# Patient Record
Sex: FEMALE | URBAN - METROPOLITAN AREA
[De-identification: names, ages, dates, MRNs, and addresses within clinical notes are randomized per-mention and may not be internally consistent; named-entity substitution may affect disease eponyms.]

---

## 2019-01-14 ENCOUNTER — OFFICE VISIT - HEALTHEAST (OUTPATIENT)
Dept: GERIATRICS | Facility: CLINIC | Age: 84
End: 2019-01-14

## 2019-01-14 DIAGNOSIS — S82.245S: ICD-10-CM

## 2019-01-14 DIAGNOSIS — F32.A DEPRESSION, UNSPECIFIED DEPRESSION TYPE: ICD-10-CM

## 2019-01-14 DIAGNOSIS — E66.01 MORBID OBESITY WITH BODY MASS INDEX (BMI) OF 40.0 TO 44.9 IN ADULT (H): ICD-10-CM

## 2019-01-14 DIAGNOSIS — M47.816 OSTEOARTHRITIS OF LUMBAR SPINE, UNSPECIFIED SPINAL OSTEOARTHRITIS COMPLICATION STATUS: ICD-10-CM

## 2019-01-14 DIAGNOSIS — Z79.4 INSULIN-REQUIRING OR DEPENDENT TYPE II DIABETES MELLITUS (H): ICD-10-CM

## 2019-01-14 DIAGNOSIS — E11.9 INSULIN-REQUIRING OR DEPENDENT TYPE II DIABETES MELLITUS (H): ICD-10-CM

## 2019-01-14 DIAGNOSIS — G89.29 OTHER CHRONIC PAIN: ICD-10-CM

## 2019-01-15 ENCOUNTER — OFFICE VISIT - HEALTHEAST (OUTPATIENT)
Dept: GERIATRICS | Facility: CLINIC | Age: 84
End: 2019-01-15

## 2019-01-15 ENCOUNTER — AMBULATORY - HEALTHEAST (OUTPATIENT)
Dept: ADMINISTRATIVE | Facility: CLINIC | Age: 84
End: 2019-01-15

## 2019-01-15 DIAGNOSIS — I10 ESSENTIAL HYPERTENSION: ICD-10-CM

## 2019-01-15 DIAGNOSIS — S82.832D CLOSED FRACTURE OF PROXIMAL END OF LEFT FIBULA WITH ROUTINE HEALING, UNSPECIFIED FRACTURE MORPHOLOGY, SUBSEQUENT ENCOUNTER: ICD-10-CM

## 2019-01-15 DIAGNOSIS — E10.9 TYPE 1 DIABETES MELLITUS WITHOUT COMPLICATION (H): ICD-10-CM

## 2019-01-15 DIAGNOSIS — M51.369 DDD (DEGENERATIVE DISC DISEASE), LUMBAR: ICD-10-CM

## 2019-01-15 RX ORDER — QUINAPRIL 20 MG/1
40 TABLET ORAL DAILY
Status: SHIPPED | COMMUNITY
Start: 2019-01-15

## 2019-01-15 RX ORDER — GABAPENTIN 300 MG/1
300 CAPSULE ORAL DAILY
Status: SHIPPED | COMMUNITY
Start: 2019-01-15

## 2019-01-15 RX ORDER — ASPIRIN 81 MG/1
81 TABLET, CHEWABLE ORAL DAILY
Status: SHIPPED | COMMUNITY
Start: 2019-01-15

## 2019-01-15 RX ORDER — SIMVASTATIN 20 MG
20 TABLET ORAL AT BEDTIME
Status: SHIPPED | COMMUNITY
Start: 2019-01-15

## 2019-01-15 RX ORDER — LANOLIN ALCOHOL/MO/W.PET/CERES
3 CREAM (GRAM) TOPICAL AT BEDTIME
Status: SHIPPED | COMMUNITY
Start: 2019-01-15

## 2019-01-15 RX ORDER — METFORMIN HCL 500 MG
1000 TABLET, EXTENDED RELEASE 24 HR ORAL
Status: SHIPPED | COMMUNITY
Start: 2019-01-15

## 2019-01-16 ENCOUNTER — RECORDS - HEALTHEAST (OUTPATIENT)
Dept: LAB | Facility: CLINIC | Age: 84
End: 2019-01-16

## 2019-01-18 ENCOUNTER — OFFICE VISIT - HEALTHEAST (OUTPATIENT)
Dept: GERIATRICS | Facility: CLINIC | Age: 84
End: 2019-01-18

## 2019-01-18 DIAGNOSIS — I10 ESSENTIAL HYPERTENSION: ICD-10-CM

## 2019-01-18 DIAGNOSIS — M51.369 DDD (DEGENERATIVE DISC DISEASE), LUMBAR: ICD-10-CM

## 2019-01-18 DIAGNOSIS — S82.832D CLOSED FRACTURE OF PROXIMAL END OF LEFT FIBULA WITH ROUTINE HEALING, UNSPECIFIED FRACTURE MORPHOLOGY, SUBSEQUENT ENCOUNTER: ICD-10-CM

## 2019-01-18 DIAGNOSIS — E10.9 TYPE 1 DIABETES MELLITUS WITHOUT COMPLICATION (H): ICD-10-CM

## 2019-01-18 LAB — 25(OH)D3 SERPL-MCNC: 23.1 NG/ML (ref 30–80)

## 2019-01-22 ENCOUNTER — OFFICE VISIT - HEALTHEAST (OUTPATIENT)
Dept: GERIATRICS | Facility: CLINIC | Age: 84
End: 2019-01-22

## 2019-01-22 DIAGNOSIS — S82.832D CLOSED FRACTURE OF PROXIMAL END OF LEFT FIBULA WITH ROUTINE HEALING, UNSPECIFIED FRACTURE MORPHOLOGY, SUBSEQUENT ENCOUNTER: ICD-10-CM

## 2019-01-22 DIAGNOSIS — I10 ESSENTIAL HYPERTENSION: ICD-10-CM

## 2019-01-22 DIAGNOSIS — M51.369 DDD (DEGENERATIVE DISC DISEASE), LUMBAR: ICD-10-CM

## 2019-01-22 DIAGNOSIS — E10.9 TYPE 1 DIABETES MELLITUS WITHOUT COMPLICATION (H): ICD-10-CM

## 2019-01-25 ENCOUNTER — OFFICE VISIT - HEALTHEAST (OUTPATIENT)
Dept: GERIATRICS | Facility: CLINIC | Age: 84
End: 2019-01-25

## 2019-01-25 DIAGNOSIS — S82.832D CLOSED FRACTURE OF PROXIMAL END OF LEFT FIBULA WITH ROUTINE HEALING, UNSPECIFIED FRACTURE MORPHOLOGY, SUBSEQUENT ENCOUNTER: ICD-10-CM

## 2019-01-25 DIAGNOSIS — I10 ESSENTIAL HYPERTENSION: ICD-10-CM

## 2019-01-25 DIAGNOSIS — E10.9 TYPE 1 DIABETES MELLITUS WITHOUT COMPLICATION (H): ICD-10-CM

## 2019-01-25 DIAGNOSIS — R53.1 GENERAL WEAKNESS: ICD-10-CM

## 2019-01-29 ENCOUNTER — OFFICE VISIT - HEALTHEAST (OUTPATIENT)
Dept: GERIATRICS | Facility: CLINIC | Age: 84
End: 2019-01-29

## 2019-01-29 DIAGNOSIS — R53.81 PHYSICAL DEBILITY: ICD-10-CM

## 2019-01-29 DIAGNOSIS — S82.832D CLOSED FRACTURE OF PROXIMAL END OF LEFT FIBULA WITH ROUTINE HEALING, UNSPECIFIED FRACTURE MORPHOLOGY, SUBSEQUENT ENCOUNTER: ICD-10-CM

## 2019-01-29 DIAGNOSIS — E10.9 TYPE 1 DIABETES MELLITUS WITHOUT COMPLICATION (H): ICD-10-CM

## 2019-01-29 DIAGNOSIS — I10 ESSENTIAL HYPERTENSION: ICD-10-CM

## 2019-02-01 ENCOUNTER — OFFICE VISIT - HEALTHEAST (OUTPATIENT)
Dept: GERIATRICS | Facility: CLINIC | Age: 84
End: 2019-02-01

## 2019-02-01 DIAGNOSIS — S82.832D CLOSED FRACTURE OF PROXIMAL END OF LEFT FIBULA WITH ROUTINE HEALING, UNSPECIFIED FRACTURE MORPHOLOGY, SUBSEQUENT ENCOUNTER: ICD-10-CM

## 2019-02-01 DIAGNOSIS — E10.9 TYPE 1 DIABETES MELLITUS WITHOUT COMPLICATION (H): ICD-10-CM

## 2019-02-01 DIAGNOSIS — R52 PAIN MANAGEMENT: ICD-10-CM

## 2019-02-01 DIAGNOSIS — R29.898 LEG WEAKNESS, BILATERAL: ICD-10-CM

## 2019-02-05 ENCOUNTER — OFFICE VISIT - HEALTHEAST (OUTPATIENT)
Dept: GERIATRICS | Facility: CLINIC | Age: 84
End: 2019-02-05

## 2019-02-05 DIAGNOSIS — E10.9 TYPE 1 DIABETES MELLITUS WITHOUT COMPLICATION (H): ICD-10-CM

## 2019-02-05 DIAGNOSIS — S82.832D CLOSED FRACTURE OF PROXIMAL END OF LEFT FIBULA WITH ROUTINE HEALING, UNSPECIFIED FRACTURE MORPHOLOGY, SUBSEQUENT ENCOUNTER: ICD-10-CM

## 2019-02-05 DIAGNOSIS — R53.81 PHYSICAL DEBILITY: ICD-10-CM

## 2019-02-05 DIAGNOSIS — I10 ESSENTIAL HYPERTENSION: ICD-10-CM

## 2019-02-05 RX ORDER — CLONIDINE HYDROCHLORIDE 0.1 MG/1
0.1 TABLET ORAL DAILY
Status: SHIPPED | COMMUNITY
Start: 2019-02-05

## 2019-02-08 ENCOUNTER — OFFICE VISIT - HEALTHEAST (OUTPATIENT)
Dept: GERIATRICS | Facility: CLINIC | Age: 84
End: 2019-02-08

## 2019-02-08 DIAGNOSIS — I10 ESSENTIAL HYPERTENSION: ICD-10-CM

## 2019-02-08 DIAGNOSIS — S82.832D CLOSED FRACTURE OF PROXIMAL END OF LEFT FIBULA WITH ROUTINE HEALING, UNSPECIFIED FRACTURE MORPHOLOGY, SUBSEQUENT ENCOUNTER: ICD-10-CM

## 2019-02-08 DIAGNOSIS — E10.9 TYPE 1 DIABETES MELLITUS WITHOUT COMPLICATION (H): ICD-10-CM

## 2019-02-08 DIAGNOSIS — M51.369 DDD (DEGENERATIVE DISC DISEASE), LUMBAR: ICD-10-CM

## 2019-02-11 ENCOUNTER — OFFICE VISIT - HEALTHEAST (OUTPATIENT)
Dept: GERIATRICS | Facility: CLINIC | Age: 84
End: 2019-02-11

## 2019-02-11 DIAGNOSIS — S82.832D CLOSED FRACTURE OF PROXIMAL END OF LEFT FIBULA WITH ROUTINE HEALING, UNSPECIFIED FRACTURE MORPHOLOGY, SUBSEQUENT ENCOUNTER: ICD-10-CM

## 2019-02-11 DIAGNOSIS — G47.00 INSOMNIA, UNSPECIFIED TYPE: ICD-10-CM

## 2019-02-11 DIAGNOSIS — R53.81 PHYSICAL DEBILITY: ICD-10-CM

## 2019-02-11 DIAGNOSIS — I10 HYPERTENSION, UNSPECIFIED TYPE: ICD-10-CM

## 2019-02-11 DIAGNOSIS — R29.898 LEG WEAKNESS, BILATERAL: ICD-10-CM

## 2019-02-15 ENCOUNTER — OFFICE VISIT - HEALTHEAST (OUTPATIENT)
Dept: GERIATRICS | Facility: CLINIC | Age: 84
End: 2019-02-15

## 2019-02-15 DIAGNOSIS — I10 ESSENTIAL HYPERTENSION: ICD-10-CM

## 2019-02-15 DIAGNOSIS — S82.832D CLOSED FRACTURE OF PROXIMAL END OF LEFT FIBULA WITH ROUTINE HEALING, UNSPECIFIED FRACTURE MORPHOLOGY, SUBSEQUENT ENCOUNTER: ICD-10-CM

## 2019-02-15 DIAGNOSIS — E10.9 TYPE 1 DIABETES MELLITUS WITHOUT COMPLICATION (H): ICD-10-CM

## 2019-02-15 DIAGNOSIS — G47.9 SLEEP DISORDER: ICD-10-CM

## 2019-02-15 RX ORDER — HYDROCHLOROTHIAZIDE 25 MG/1
25 TABLET ORAL DAILY
Status: SHIPPED | COMMUNITY
Start: 2019-02-15

## 2019-02-18 ENCOUNTER — AMBULATORY - HEALTHEAST (OUTPATIENT)
Dept: GERIATRICS | Facility: CLINIC | Age: 84
End: 2019-02-18

## 2019-03-01 ENCOUNTER — RECORDS - HEALTHEAST (OUTPATIENT)
Dept: LAB | Facility: CLINIC | Age: 84
End: 2019-03-01

## 2019-03-05 LAB — 25(OH)D3 SERPL-MCNC: 26.2 NG/ML (ref 30–80)

## 2019-03-14 ENCOUNTER — RECORDS - HEALTHEAST (OUTPATIENT)
Dept: LAB | Facility: CLINIC | Age: 84
End: 2019-03-14

## 2019-03-14 LAB
ANION GAP SERPL CALCULATED.3IONS-SCNC: 7 MMOL/L (ref 5–18)
BUN SERPL-MCNC: 19 MG/DL (ref 8–28)
CALCIUM SERPL-MCNC: 9.1 MG/DL (ref 8.5–10.5)
CHLORIDE BLD-SCNC: 107 MMOL/L (ref 98–107)
CO2 SERPL-SCNC: 25 MMOL/L (ref 22–31)
CREAT SERPL-MCNC: 1.06 MG/DL (ref 0.6–1.1)
ERYTHROCYTE [DISTWIDTH] IN BLOOD BY AUTOMATED COUNT: 15.2 % (ref 11–14.5)
GFR SERPL CREATININE-BSD FRML MDRD: 49 ML/MIN/1.73M2
GLUCOSE BLD-MCNC: 137 MG/DL (ref 70–125)
HBA1C MFR BLD: 7.5 % (ref 4.2–6.1)
HCT VFR BLD AUTO: 37.9 % (ref 35–47)
HGB BLD-MCNC: 11.7 G/DL (ref 12–16)
MCH RBC QN AUTO: 29 PG (ref 27–34)
MCHC RBC AUTO-ENTMCNC: 30.9 G/DL (ref 32–36)
MCV RBC AUTO: 94 FL (ref 80–100)
PLATELET # BLD AUTO: 297 THOU/UL (ref 140–440)
PMV BLD AUTO: 8.9 FL (ref 8.5–12.5)
POTASSIUM BLD-SCNC: 4.6 MMOL/L (ref 3.5–5)
RBC # BLD AUTO: 4.04 MILL/UL (ref 3.8–5.4)
SODIUM SERPL-SCNC: 139 MMOL/L (ref 136–145)
WBC: 6.5 THOU/UL (ref 4–11)

## 2019-04-03 ENCOUNTER — RECORDS - HEALTHEAST (OUTPATIENT)
Dept: LAB | Facility: CLINIC | Age: 84
End: 2019-04-03

## 2019-04-03 LAB
ALBUMIN UR-MCNC: NEGATIVE MG/DL
APPEARANCE UR: ABNORMAL
BACTERIA #/AREA URNS HPF: ABNORMAL HPF
BILIRUB UR QL STRIP: NEGATIVE
COLOR UR AUTO: ABNORMAL
ERYTHROCYTE [DISTWIDTH] IN BLOOD BY AUTOMATED COUNT: 15.9 % (ref 11–14.5)
GLUCOSE UR STRIP-MCNC: ABNORMAL MG/DL
HCT VFR BLD AUTO: 37.4 % (ref 35–47)
HGB BLD-MCNC: 11.6 G/DL (ref 12–16)
HGB UR QL STRIP: ABNORMAL
KETONES UR STRIP-MCNC: NEGATIVE MG/DL
LEUKOCYTE ESTERASE UR QL STRIP: ABNORMAL
MCH RBC QN AUTO: 29.4 PG (ref 27–34)
MCHC RBC AUTO-ENTMCNC: 31 G/DL (ref 32–36)
MCV RBC AUTO: 95 FL (ref 80–100)
NITRATE UR QL: POSITIVE
PH UR STRIP: 5 [PH] (ref 4.5–8)
PLATELET # BLD AUTO: 316 THOU/UL (ref 140–440)
PMV BLD AUTO: 8.7 FL (ref 8.5–12.5)
RBC # BLD AUTO: 3.94 MILL/UL (ref 3.8–5.4)
RBC #/AREA URNS AUTO: ABNORMAL HPF
SP GR UR STRIP: 1.01 (ref 1–1.03)
SQUAMOUS #/AREA URNS AUTO: ABNORMAL LPF
UROBILINOGEN UR STRIP-ACNC: ABNORMAL
WBC #/AREA URNS AUTO: >100 HPF
WBC CLUMPS #/AREA URNS HPF: PRESENT /[HPF]
WBC: 12.8 THOU/UL (ref 4–11)

## 2019-04-05 LAB — BACTERIA SPEC CULT: ABNORMAL

## 2019-05-10 ENCOUNTER — RECORDS - HEALTHEAST (OUTPATIENT)
Dept: LAB | Facility: CLINIC | Age: 84
End: 2019-05-10

## 2019-05-10 LAB — 25(OH)D3 SERPL-MCNC: 20.8 NG/ML (ref 30–80)

## 2021-06-02 ENCOUNTER — RECORDS - HEALTHEAST (OUTPATIENT)
Dept: ADMINISTRATIVE | Facility: CLINIC | Age: 86
End: 2021-06-02

## 2021-06-02 VITALS — WEIGHT: 229.2 LBS

## 2021-06-16 PROBLEM — M51.369 DDD (DEGENERATIVE DISC DISEASE), LUMBAR: Status: ACTIVE | Noted: 2019-01-15

## 2021-06-16 PROBLEM — I10 ESSENTIAL HYPERTENSION: Status: ACTIVE | Noted: 2019-01-15

## 2021-06-16 PROBLEM — E10.9 TYPE 1 DIABETES MELLITUS WITHOUT COMPLICATION (H): Status: ACTIVE | Noted: 2019-01-15

## 2021-06-16 PROBLEM — S82.832D CLOSED FRACTURE OF PROXIMAL END OF LEFT FIBULA WITH ROUTINE HEALING, UNSPECIFIED FRACTURE MORPHOLOGY, SUBSEQUENT ENCOUNTER: Status: ACTIVE | Noted: 2019-01-15

## 2021-06-18 NOTE — LETTER
Letter by Johnson, Michael Duane, CNP at      Author: Johnson, Michael Duane, CNP Service: -- Author Type: --    Filed:  Encounter Date: 2/15/2019 Status: (Other)         Patient: Wendie Castellanos   MR Number: [id number]   YOB: 1929   Date of Visit: 2/15/2019     [facility]    Facility:   [facility] [[id number]]   Code Status: FULL CODE  PCP: Nichole Kahn PA-C   Phone: [phone]   Fax: [phone]      CHIEF COMPLAINT/REASON FOR VISIT:  Chief Complaint   Patient presents with   ? Discharge Summary       HISTORY COURSE:  Wendie is a 89 y.o. female who I was asked to see secondary to most recent admission to the transitional care unit but also most recent hospitalization January 7 - January 11, 2019 secondary to a fall while trying to get off the toilet earlier in the day.  She has a history of right leg pain and weakness chronically due to lumbar degenerative disc disease.  She hit her left leg on the toilet.  The x-ray did show a spiral fracture of the tibia and she went for surgery on January 8, 2019.  She also does have a history of diabetes we did a chance to address that.  She is currently on insulin 15 units twice daily 7030 mix along with metformin thousand milligrams daily and blood sugars have been pretty much less than 170 there was one at 250 4P most recently was having issues with sleep melatonin was initially instituted she did sleep pretty good last night.  Regarding her osteoporosis she does have a vitamin D level ordered we will wait for those results.   She has been on the transitional care unit and at this point fairly limited rehabilitation has been able to do slide board transfers but the progress has stalled.  They did recommend long-term care for her.  She will be discharging to a long-term care facility.  Her blood pressures have been occasionally labile but more so running in the 140-160 systolic range of been a number of  adjustments to her medication including continuing with the lisinopril but also adding clonidine 0.1 mg daily and then recently added HCTZ 25 mg.  That did seem to normalize her blood pressures.  For sleep was ordered melatonin and then eventually added was trazodone 50 mg and her sleep also did improve.  Not having any pain.  Her blood sugars overall have been doing fairly well using her 7030 mix 15 units twice daily.  The blood sugars in the morning range 121-170 in the p.m. ranging 140-207.  Her appetite is good.  Her moods have been stable she does have a history of depression currently on fluoxetine 40 mg.  Also added was vitamin D2 50,000 units weekly secondary to a vitamin D level of 23 on January 17 and in about 12 weeks from that date she should have another vitamin D checked.  Review of Systems  Currently she denies any chills or fever coughing wheezing chest pain dizziness or vertigo nausea vomiting diarrhea dysuria flulike symptoms headache stiff neck or swollen glands.  History of lumbar degenerative disc disease history of falls chronic pain depression diabetes and recent left tibial fracture     Vitals:    02/15/19 1016   BP: 139/65   Pulse: 69   Resp: 18   Temp: 97.1  F (36.2  C)   SpO2: 96%       Physical Exam  Constitutional: No distress.   Cardiovascular: Normal rate, regular rhythm and normal heart sounds.   Pulmonary/Chest: Breath sounds normal.   Abdominal: Bowel sounds are normal.   Musculoskeletal:   Recent left tibial fracture secondary to fall status post IM nail.  History of lumbar degenerative disc disease.  History of falls .  Cam boot.   Neurological: She is alert.   Psychiatric: Her behavior is normal.       Lab Results   Component Value Date    WBC 6.9 08/16/2010    HGB 13.9 08/16/2010    HCT 40.7 08/16/2010    MCV 90 08/16/2010     08/16/2010     Vitamin D, Total (25-Hydroxy)   Date Value Ref Range Status   01/17/2019 23.1 (L) 30.0 - 80.0 ng/mL Final     Results for orders  placed or performed during the hospital encounter of 08/16/10   Basic Metabolic Panel   Result Value Ref Range    Sodium 138 136 - 145 mmol/L    Potassium 3.6 3.5 - 5.0 mmol/L    CO2 29 25 - 31 mmol/L    Chloride 101 98 - 107 mmol/L    Anion Gap, Calculation 8 5 - 18 mmol/L    BUN 11 8 - 28 mg/dL    Creatinine 0.90 0.60 - 1.10 mg/dL    GFR MDRD Non Af Amer >60 >60 ml/min/1.73m2    GFR MDRD Af Amer >60 >60 ml/min/1.73m2    Glucose 183 (H) 70 - 125 mg/dL    Calcium 9.5 8.5 - 10.5 mg/dL         MEDICATION LIST:  Current Outpatient Medications   Medication Sig   ? hydroCHLOROthiazide (HYDRODIURIL) 25 MG tablet Take 25 mg by mouth daily.   ? aspirin 81 mg chewable tablet Chew 81 mg daily.   ? cloNIDine HCl (CATAPRES) 0.1 MG tablet Take 0.1 mg by mouth daily.   ? FLUoxetine (PROZAC) 20 MG capsule Take 40 mg by mouth daily.   ? gabapentin (NEURONTIN) 300 MG capsule Take 300 mg by mouth daily.   ? insulin aspart protamine-insulin aspart (NOVOLOG/HUMALOG 70-30 FLEXPEN) 100 unit/mL (70-30) pen Inject 15 Units under the skin 2 (two) times a day before meals.   ? melatonin 3 mg Tab tablet Take 3 mg by mouth at bedtime.   ? metFORMIN (GLUCOPHAGE-XR) 500 MG 24 hr tablet Take 1,000 mg by mouth daily with supper.   ? quinapril (ACCUPRIL) 20 MG tablet Take 40 mg by mouth daily.   ? simvastatin (ZOCOR) 20 MG tablet Take 20 mg by mouth at bedtime.       DISCHARGE DIAGNOSIS:    ICD-10-CM    1. Essential hypertension I10    2. Type 1 diabetes mellitus without complication (H) E10.9    3. Closed fracture of proximal end of left fibula with routine healing, unspecified fracture morphology, subsequent encounter S82.832D    4. Sleep disorder G47.9        MEDICAL EQUIPMENT NEEDS:  None    DISCHARGE PLAN/FACE TO FACE:  I certify that services are/were furnished while this patient was under the care of a physician and that a physician or an allowed non-physician practitioner (NPP), had a face-to-face encounter that meets the physician  face-to-face encounter requirements. The encounter was in whole, or in part, related to the primary reason for home health. The patient is confined to his/her home and needs intermittent skilled nursing, physical therapy, speech-language pathology, or the continued need for occupational therapy. A plan of care has been established by a physician and is periodically reviewed by a physician.  Date of Face-to-Face Encounter: February 15, 2019    I certify that, based on my findings, the following services are medically necessary home health services: She will be discharging to [facility] on February 15, 2019 with current medications and also will receive physical and occupational therapy    My clinical findings support the need for the above skilled services because: (Please write a brief narrative summary that describes what the RN, PT, SLP, or other services will be doing in the home. A list of diagnoses in this section does not meet the CMS requirements.)  She will require the skilled services secondary to her tibial fracture along with generalized weakness self-care deficits plateauing therapy.    This patient is homebound because: (Please write a brief narrative summary describing the functional limitations as to why this patient is homebound and specifically what makes this patient homebound.)  Secondary to multiple chronic medical conditions including her tibial fracture self-care deficits safety medication management and she will be going to long-term care.    The patient is, or has been, under my care and I have initiated the establishment of the plan of care. This patient will be followed by a physician who will periodically review the plan of care.    Schedule follow up visit with primary care provider within 7 days to reestablish care.  She will follow-up with the primary care provider at the facility to go over her medications and certainly any future laboratory studies including the next vitamin D  level 12 weeks after January 17 secondary to a level of 23 and currently on vitamin D2 50,000 units weekly.  Also manage and monitor and follow the blood pressures closely and make adjustments as needed.  She did not have any other further questions or concerns regarding her stay as well as interventions while on the transitional care unit.    Discharge coordination of care greater than 30 minutes.  Electronically signed by: Michael Duane Johnson, CNP

## 2021-06-18 NOTE — LETTER
Letter by Johnson, Michael Duane, CNP at      Author: Johnson, Michael Duane, CNP Service: -- Author Type: --    Filed:  Encounter Date: 1/18/2019 Status: (Other)         Patient: Wendie Castellanos   MR Number: [id number]   YOB: 1929   Date of Visit: 1/18/2019     [facility]    Facility:   [facility] [[id number]]   Code Status: FULL CODE      CHIEF COMPLAINT/REASON FOR VISIT:  Chief Complaint   Patient presents with   ? Follow Up     rehab, foot       HISTORY:      HPI: Wendie is a 89 y.o. female who I had the pleasure to revisit with secondary to her hospital admission January 7 - January 11, 2019 secondary to a fall sustaining a spiral fracture of the tibia status post IM nail January 2019.  She currently does have a cam boot good CMS to her left foot.  Blood sugars ranging pretty much all less than 180 she has been normotensive and afebrile.  Taking melatonin for sleep which she states her sleep are working well.  Regular diet.  And also the pain is managed she does take gabapentin 300 mg during the day otherwise no other Tylenol or other analgesic.    Past Medical History:   Diagnosis Date   ? Branch retinal vein occlusion with macular edema of left eye    ? Closed fracture of shaft of left tibia    ? DDD (degenerative disc disease), lumbar    ? Depression    ? DM2 (diabetes mellitus, type 2) (H)    ? LTBI (latent tuberculosis infection)    ? Morbid obesity (H)    ? Nonproliferative diabetic retinopathy of both eyes (H)    ? Ocular hypertension    ? Pseudophakia of both eyes    ? Urinary incontinence              Family History   Problem Relation Age of Onset   ? Heart disease Mother         MI   ? Heart disease Father         MI   ? Glaucoma Brother         2 brothers    ? Diabetes Brother    ? Diabetes Brother    ? Diabetes Brother    ? Heart disease Brother    ? Heart disease Brother    ? Heart disease Brother      Social History     Socioeconomic  History   ? Marital status:      Spouse name: Not on file   ? Number of children: Not on file   ? Years of education: Not on file   ? Highest education level: Not on file   Social Needs   ? Financial resource strain: Not on file   ? Food insecurity - worry: Not on file   ? Food insecurity - inability: Not on file   ? Transportation needs - medical: Not on file   ? Transportation needs - non-medical: Not on file   Occupational History   ? Not on file   Tobacco Use   ? Smoking status: Never Smoker   ? Smokeless tobacco: Never Used   Substance and Sexual Activity   ? Alcohol use: No   ? Drug use: No   ? Sexual activity: No   Other Topics Concern   ? Not on file   Social History Narrative   ? Not on file         Review of Systems  Currently she denies any chills or fever coughing wheezing chest pain dizziness or vertigo nausea vomiting diarrhea dysuria flulike symptoms headache stiff neck or swollen glands.  History of lumbar degenerative disc disease history of falls chronic pain depression diabetes and recent left tibial fracture    Current Outpatient Medications   Medication Sig   ? aspirin 81 mg chewable tablet Chew 81 mg daily.   ? FLUoxetine (PROZAC) 20 MG capsule Take 40 mg by mouth daily.   ? gabapentin (NEURONTIN) 300 MG capsule Take 300 mg by mouth daily.   ? insulin aspart protamine-insulin aspart (NOVOLOG/HUMALOG 70-30 FLEXPEN) 100 unit/mL (70-30) pen Inject 15 Units under the skin 2 (two) times a day before meals.   ? melatonin 3 mg Tab tablet Take 3 mg by mouth at bedtime.   ? metFORMIN (GLUCOPHAGE-XR) 500 MG 24 hr tablet Take 1,000 mg by mouth daily with supper.   ? quinapril (ACCUPRIL) 20 MG tablet Take 40 mg by mouth daily.   ? simvastatin (ZOCOR) 20 MG tablet Take 20 mg by mouth at bedtime.       .There were no vitals filed for this visit.  Blood pressure 133/73 pulse 79 respirations 16 temperature 96.8 saturation room air 92%  Physical Exam  Constitutional: No distress.   HENT: Eyes: Pupils are  equal, round, and reactive to light.   Neck: Neck supple. No thyromegaly present.   Cardiovascular: Normal rate, regular rhythm and normal heart sounds.   Pulmonary/Chest: Breath sounds normal.   Abdominal: Bowel sounds are normal. There is no tenderness. There is no guarding.   Musculoskeletal:   Recent left tibial fracture secondary to fall status post IM nail.  History of lumbar degenerative disc disease.  History of falls  .  Cam boot  Lymphadenopathy:     She has no cervical adenopathy.   Neurological: She is alert.   Psychiatric: Her behavior is normal.        LABS:   Lab Results   Component Value Date    WBC 6.9 08/16/2010    HGB 13.9 08/16/2010    HCT 40.7 08/16/2010    MCV 90 08/16/2010     08/16/2010     No results found for: HGBA1C      ASSESSMENT:      ICD-10-CM    1. Closed fracture of proximal end of left fibula with routine healing, unspecified fracture morphology, subsequent encounter S82.832D    2. DDD (degenerative disc disease), lumbar M51.36    3. Essential hypertension I10    4. Type 1 diabetes mellitus without complication (H) E10.9        PLAN:    At this time no further changes she does have a visit follow-up with orthopedics on January 22.  Continue to manage and follow.      Electronically signed by: Michael Duane Johnson, CNP

## 2021-06-18 NOTE — LETTER
Letter by Johnson, Michael Duane, CNP at      Author: Johnson, Michael Duane, CNP Service: -- Author Type: --    Filed:  Encounter Date: 2/5/2019 Status: (Other)         Patient: Wendie Castellanos   MR Number: [id number]   YOB: 1929   Date of Visit: 2/5/2019     [facility]    Facility:   [facility] [[id number]]   Code Status: FULL CODE      CHIEF COMPLAINT/REASON FOR VISIT:  Chief Complaint   Patient presents with   ? Follow Up     rehab, htn, tib fx       HISTORY:      HPI: Wendie is a 89 y.o. female who I had the pleasure to revisit with secondary to her hospitalization January 7 - January 11, 2019 secondary to a fall sustaining a spiral fracture of the tibia status post IM nail.  From a therapy standpoint she still is a moderate to max assist slide board transfers they are actually looking for long-term care probably another week or 2 on the facility.  Appetite is good.  Her blood pressures have been running anywhere from 140-170 systolically and currently is on lisinopril 40 mg daily will now add clonidine 0.1 mg daily.  Not having any pain issues.  Blood sugars for the most part most of them less than 190 there have been a couple in the mid 200 range but no recent changes of her insulin we will keep a close eye on that.  Does take melatonin for sleep.  Did not really have any other particular questions regarding her stay on the transitional care unit she is also looking for a care conference which is not yet been determined.    Past Medical History:   Diagnosis Date   ? Branch retinal vein occlusion with macular edema of left eye    ? Closed fracture of shaft of left tibia    ? DDD (degenerative disc disease), lumbar    ? Depression    ? DM2 (diabetes mellitus, type 2) (H)    ? LTBI (latent tuberculosis infection)    ? Morbid obesity (H)    ? Nonproliferative diabetic retinopathy of both eyes (H)    ? Ocular hypertension    ? Pseudophakia of both eyes     ? Urinary incontinence              Family History   Problem Relation Age of Onset   ? Heart disease Mother         MI   ? Heart disease Father         MI   ? Glaucoma Brother         2 brothers    ? Diabetes Brother    ? Diabetes Brother    ? Diabetes Brother    ? Heart disease Brother    ? Heart disease Brother    ? Heart disease Brother      Social History     Socioeconomic History   ? Marital status:      Spouse name: Not on file   ? Number of children: Not on file   ? Years of education: Not on file   ? Highest education level: Not on file   Social Needs   ? Financial resource strain: Not on file   ? Food insecurity - worry: Not on file   ? Food insecurity - inability: Not on file   ? Transportation needs - medical: Not on file   ? Transportation needs - non-medical: Not on file   Occupational History   ? Not on file   Tobacco Use   ? Smoking status: Never Smoker   ? Smokeless tobacco: Never Used   Substance and Sexual Activity   ? Alcohol use: No   ? Drug use: No   ? Sexual activity: No   Other Topics Concern   ? Not on file   Social History Narrative   ? Not on file         Review of Systems  Currently she denies any chills or fever coughing wheezing chest pain dizziness or vertigo nausea vomiting diarrhea dysuria flulike symptoms headache stiff neck or swollen glands.  History of lumbar degenerative disc disease history of falls chronic pain depression diabetes and recent left tibial fracture    Current Outpatient Medications   Medication Sig   ? cloNIDine HCl (CATAPRES) 0.1 MG tablet Take 0.1 mg by mouth daily.   ? aspirin 81 mg chewable tablet Chew 81 mg daily.   ? FLUoxetine (PROZAC) 20 MG capsule Take 40 mg by mouth daily.   ? gabapentin (NEURONTIN) 300 MG capsule Take 300 mg by mouth daily.   ? insulin aspart protamine-insulin aspart (NOVOLOG/HUMALOG 70-30 FLEXPEN) 100 unit/mL (70-30) pen Inject 15 Units under the skin 2 (two) times a day before meals.   ? melatonin 3 mg Tab tablet Take 3 mg by  mouth at bedtime.   ? metFORMIN (GLUCOPHAGE-XR) 500 MG 24 hr tablet Take 1,000 mg by mouth daily with supper.   ? quinapril (ACCUPRIL) 20 MG tablet Take 40 mg by mouth daily.   ? simvastatin (ZOCOR) 20 MG tablet Take 20 mg by mouth at bedtime.       .There were no vitals filed for this visit.  Blood pressure 158/73 pulse 74 respirations 18 temperature 98.2 saturation room air 93%  Physical Exam  Constitutional: No distress.   Cardiovascular: Normal rate, regular rhythm and normal heart sounds.   Pulmonary/Chest: Breath sounds normal.   Abdominal: Bowel sounds are normal.   Musculoskeletal:   Recent left tibial fracture secondary to fall status post IM nail.  History of lumbar degenerative disc disease.  History of falls .  Cam boot.  The surgical scars are healing.Neurological: She is alert.   Psychiatric: Her behavior is normal.       LABS:   Lab Results   Component Value Date    WBC 6.9 08/16/2010    HGB 13.9 08/16/2010    HCT 40.7 08/16/2010    MCV 90 08/16/2010     08/16/2010     Results for orders placed or performed during the hospital encounter of 08/16/10   Basic Metabolic Panel   Result Value Ref Range    Sodium 138 136 - 145 mmol/L    Potassium 3.6 3.5 - 5.0 mmol/L    CO2 29 25 - 31 mmol/L    Chloride 101 98 - 107 mmol/L    Anion Gap, Calculation 8 5 - 18 mmol/L    BUN 11 8 - 28 mg/dL    Creatinine 0.90 0.60 - 1.10 mg/dL    GFR MDRD Non Af Amer >60 >60 ml/min/1.73m2    GFR MDRD Af Amer >60 >60 ml/min/1.73m2    Glucose 183 (H) 70 - 125 mg/dL    Calcium 9.5 8.5 - 10.5 mg/dL       No results found for: HGBA1C      ASSESSMENT:      ICD-10-CM    1. Essential hypertension I10    2. Type 1 diabetes mellitus without complication (H) E10.9    3. Physical debility R53.81    4. Closed fracture of proximal end of left fibula with routine healing, unspecified fracture morphology, subsequent encounter S82.382D        PLAN:    Adding clonidine 0.1 mg daily for the blood pressures keep a close eye on the blood  pressures as well as the blood sugars.  She will hopefully have a care conference soon and also therapy staff are suggesting long-term care.  Still moderate to max assist.      Electronically signed by: Michael Duane Johnson, CNP

## 2021-06-18 NOTE — LETTER
Letter by Chester Moe MD at      Author: Chester Moe MD Service: -- Author Type: --    Filed:  Encounter Date: 1/14/2019 Status: (Other)         Patient: Wendie Castellanos   MR Number: [id number]   YOB: 1929   Date of Visit: 1/14/2019     Code Status:  FULL CODE  Visit Type: H & P     Facility:  [facility] [[id number]]      Facility Type: SNF (Skilled Nursing Facility, TCU)    History of Present Illness:   Hospital Admission Date: January 7, 2019 [facility] Discharge Date: January 11, 2019  Facility Admission Date: January 11th 2819 [facility] care unit    Wendie Castellanos is a 89 y.o. female who is somewhat sedentary and morbidly obese with the diabetes and lumbar arthritis who fell while she was trying to get off the toilet prior to the day of admission.  She normally has right leg pain and weakness chronically due to the lumbar disc disease.  Her leg gave out and she hit her left leg on the toilet.  Her  was there at the time.  She was brought to the hospital.    Hospital course; the x-ray showed a nondisplaced is closed a spiral fracture of the tibia and orthopedics recommended a surgical treatment she went to the operating room on 1/8/19.  Surgery went well without complication and she was a somewhat sedentary and it did not get out of bed much on the ninth and because of this orthopedics recommended a TCU placement.    She was on less insulin in the hospital as her diet perhaps was better.  She comes to us on NovoLog 7030 at 15 units twice daily and gabapentin 300 at at bedtime Prozac 22 tabs every morning aspirin 81 daily metformin 500 daily quinapril 20 mg daily.    No past medical history on file.  No past surgical history on file.  No family history on file.  Social History     Socioeconomic History   ? Marital status:      Spouse name: Not on file   ? Number of children: Not on file   ? Years of education: Not on  file   ? Highest education level: Not on file   Social Needs   ? Financial resource strain: Not on file   ? Food insecurity - worry: Not on file   ? Food insecurity - inability: Not on file   ? Transportation needs - medical: Not on file   ? Transportation needs - non-medical: Not on file   Occupational History   ? Not on file   Tobacco Use   ? Smoking status: Not on file   Substance and Sexual Activity   ? Alcohol use: Not on file   ? Drug use: Not on file   ? Sexual activity: Not on file   Other Topics Concern   ? Not on file   Social History Narrative   ? Not on file     Additional Geriatric Review patient lives with her  she is very sedentary.  Has 3 boys and a girl all live locally.  Is independent in functioning and activities of daily living.  No current outpatient medications on file.     No current facility-administered medications for this visit.      Allergies not on file    There is no immunization history on file for this patient.      Review of Systems   Constitutional: Negative.  Negative for appetite change, chills, diaphoresis, fatigue, fever and unexpected weight change.   HENT: Negative for congestion, dental problem, ear pain, hearing loss, nosebleeds, sinus pressure, sore throat, tinnitus and trouble swallowing.    Eyes: Negative for photophobia, pain, discharge, itching and visual disturbance.   Respiratory: Negative for apnea, cough, chest tightness, shortness of breath and wheezing.    Cardiovascular: Negative for chest pain and palpitations.   Gastrointestinal: Negative for abdominal distention, abdominal pain, constipation and diarrhea.   Endocrine: Negative for cold intolerance, heat intolerance and polydipsia.   Genitourinary: Negative.  Negative for decreased urine volume, difficulty urinating, dysuria, enuresis, frequency, hematuria, menstrual problem, urgency and vaginal discharge.   Musculoskeletal: Negative for arthralgias, back pain and gait problem.   Allergic/Immunologic:  Negative.    Neurological: Negative for dizziness, tremors, syncope, facial asymmetry, speech difficulty, weakness, light-headedness, numbness and headaches.   Hematological: Negative.    Psychiatric/Behavioral: Positive for sleep disturbance. Negative for agitation, behavioral problems, confusion, decreased concentration, dysphoric mood and hallucinations. The patient is not hyperactive.         Physical Exam   Constitutional: She is oriented to person, place, and time. She appears well-developed and well-nourished.   Sugars to date all 100- 200   HENT:   Head: Normocephalic and atraumatic.   Right Ear: External ear normal.   Left Ear: External ear normal.   Nose: Nose normal.   Mouth/Throat: Oropharynx is clear and moist.   Eyes: Conjunctivae and EOM are normal. Pupils are equal, round, and reactive to light. Left eye exhibits no discharge. No scleral icterus.   Neck: Neck supple. No JVD present. No tracheal deviation present. No thyromegaly present.   Cardiovascular: Normal rate, regular rhythm and normal heart sounds. Exam reveals no friction rub.   No murmur heard.  Pulmonary/Chest: Effort normal and breath sounds normal. No respiratory distress. She has no wheezes. She has no rales. She exhibits no tenderness.   Abdominal: She exhibits no distension and no mass. There is no tenderness. There is no guarding.   Musculoskeletal: Normal range of motion. She exhibits no edema or tenderness.   Left leg is slightly more swollen than the right leg and was externally rotated.  No significant skin lesions.   Lymphadenopathy:     She has no cervical adenopathy.   Neurological: She is alert and oriented to person, place, and time. She has normal reflexes. No cranial nerve deficit. She exhibits normal muscle tone. Coordination normal.   Skin: Skin is warm and dry. No rash noted. No erythema.   Psychiatric: She has a normal mood and affect. Her behavior is normal. Thought content normal.   Vitals reviewed.        Labs:  All  labs reviewed in the nursing home record.    Assessment:  1. Closed nondisplaced spiral fracture of shaft of left tibia, sequela     2. Osteoarthritis of lumbar spine, unspecified spinal osteoarthritis complication status     3. Morbid obesity with body mass index (BMI) of 40.0 to 44.9 in adult (H)     4. Depression, unspecified depression type     5. Other chronic pain     6. Insulin-requiring or dependent type II diabetes mellitus (H)         Plan: Get a hemoglobin to check for post operative anemia #2 continue the AC twice daily monitoring for blood sugars #3 get a weight on the patient as we have not done that #4 get a vitamin D 25-hydroxy and hemoglobin to check for postoperative anemia and osteoporosis with vitamin D deficiency.  We will add melatonin 3 mg at at bedtime for her difficulty in getting to sleep.    Total 35 minutes of which 65% was spent in counseling and coordination of care of the above plan.    Electronically signed by: Chester Moe MD

## 2021-06-18 NOTE — LETTER
Letter by Johnson, Michael Duane, CNP at      Author: Johnson, Michael Duane, CNP Service: -- Author Type: --    Filed:  Encounter Date: 1/25/2019 Status: (Other)         Patient: Wendie Castellanos   MR Number: [id number]   YOB: 1929   Date of Visit: 1/25/2019     [facility]    Facility:   [facility] [[id number]]   Code Status: FULL CODE      CHIEF COMPLAINT/REASON FOR VISIT:  Chief Complaint   Patient presents with   ? Follow Up     rehab, tib       HISTORY:      HPI: Wendie is a 89 y.o. female who I had the chance and pleasure to revisit with secondary to her hospitalization January 7 - January 11, 2019 secondary to a fall sustaining a spiral fracture of the tibia status post IM nail.  Did see orthopedics earlier this week they did remove her staples the Steri-Strips are clean and dry.  Does have a cam boot.  Good CMS to her foot.  For pain she can have gabapentin 300 mg in the morning and she has been participating in therapy although therapy does state that she is weightbearing but they do feel the need a couple more weeks and perhaps even going to long-term care.  She has been normotensive and afebrile and also on room air.  Blood sugars actually pretty good most of less than 180 for sleep she is on melatonin 3 mg.  She did not have any other particular questions or other concerns regarding her care.  Did have a chance to address some recent changes including adding vitamin D 50,000 units weekly due to vitamin D level of 23 on January 17.  She does have Steri-Strips over her wounds and also wondering if she could sleep better if she had that cam boot off at night and I think that is a pretty good idea.    Past Medical History:   Diagnosis Date   ? Branch retinal vein occlusion with macular edema of left eye    ? Closed fracture of shaft of left tibia    ? DDD (degenerative disc disease), lumbar    ? Depression    ? DM2 (diabetes mellitus, type 2) (H)     ? LTBI (latent tuberculosis infection)    ? Morbid obesity (H)    ? Nonproliferative diabetic retinopathy of both eyes (H)    ? Ocular hypertension    ? Pseudophakia of both eyes    ? Urinary incontinence              Family History   Problem Relation Age of Onset   ? Heart disease Mother         MI   ? Heart disease Father         MI   ? Glaucoma Brother         2 brothers    ? Diabetes Brother    ? Diabetes Brother    ? Diabetes Brother    ? Heart disease Brother    ? Heart disease Brother    ? Heart disease Brother      Social History     Socioeconomic History   ? Marital status:      Spouse name: Not on file   ? Number of children: Not on file   ? Years of education: Not on file   ? Highest education level: Not on file   Social Needs   ? Financial resource strain: Not on file   ? Food insecurity - worry: Not on file   ? Food insecurity - inability: Not on file   ? Transportation needs - medical: Not on file   ? Transportation needs - non-medical: Not on file   Occupational History   ? Not on file   Tobacco Use   ? Smoking status: Never Smoker   ? Smokeless tobacco: Never Used   Substance and Sexual Activity   ? Alcohol use: No   ? Drug use: No   ? Sexual activity: No   Other Topics Concern   ? Not on file   Social History Narrative   ? Not on file         Review of Systems  Currently she denies any chills or fever coughing wheezing chest pain dizziness or vertigo nausea vomiting diarrhea dysuria flulike symptoms headache stiff neck or swollen glands.  History of lumbar degenerative disc disease history of falls chronic pain depression diabetes and recent left tibial fracture    Current Outpatient Medications   Medication Sig   ? aspirin 81 mg chewable tablet Chew 81 mg daily.   ? FLUoxetine (PROZAC) 20 MG capsule Take 40 mg by mouth daily.   ? gabapentin (NEURONTIN) 300 MG capsule Take 300 mg by mouth daily.   ? insulin aspart protamine-insulin aspart (NOVOLOG/HUMALOG 70-30 FLEXPEN) 100 unit/mL (70-30)  pen Inject 15 Units under the skin 2 (two) times a day before meals.   ? melatonin 3 mg Tab tablet Take 3 mg by mouth at bedtime.   ? metFORMIN (GLUCOPHAGE-XR) 500 MG 24 hr tablet Take 1,000 mg by mouth daily with supper.   ? quinapril (ACCUPRIL) 20 MG tablet Take 40 mg by mouth daily.   ? simvastatin (ZOCOR) 20 MG tablet Take 20 mg by mouth at bedtime.       .There were no vitals filed for this visit.  Blood pressure 98/60 pulse 68 respirations 18 temperature 98.4  Physical Exam   Constitutional: No distress.   Cardiovascular: Normal rate, regular rhythm and normal heart sounds.   Pulmonary/Chest: Breath sounds normal.   Abdominal: Bowel sounds are normal. There is no tenderness. There is no guarding.   Musculoskeletal:   Recent left tibial fracture secondary to fall status post IM nail.  History of lumbar degenerative disc disease.  History of falls .  Cam boot.  The surgical areas have Steri-Strips clean and dry.  Lymphadenopathy:     She has no cervical adenopathy.   Neurological: She is alert.   Psychiatric: Her behavior is normal.     LABS:   Lab Results   Component Value Date    WBC 6.9 08/16/2010    HGB 13.9 08/16/2010    HCT 40.7 08/16/2010    MCV 90 08/16/2010     08/16/2010     Results for orders placed or performed during the hospital encounter of 08/16/10   Basic Metabolic Panel   Result Value Ref Range    Sodium 138 136 - 145 mmol/L    Potassium 3.6 3.5 - 5.0 mmol/L    CO2 29 25 - 31 mmol/L    Chloride 101 98 - 107 mmol/L    Anion Gap, Calculation 8 5 - 18 mmol/L    BUN 11 8 - 28 mg/dL    Creatinine 0.90 0.60 - 1.10 mg/dL    GFR MDRD Non Af Amer >60 >60 ml/min/1.73m2    GFR MDRD Af Amer >60 >60 ml/min/1.73m2    Glucose 183 (H) 70 - 125 mg/dL    Calcium 9.5 8.5 - 10.5 mg/dL       No results found for: HGBA1C      ASSESSMENT:      ICD-10-CM    1. Closed fracture of proximal end of left fibula with routine healing, unspecified fracture morphology, subsequent encounter S82.832D    2. Essential  hypertension I10    3. Type 1 diabetes mellitus without complication (H) E10.9    4. General weakness R53.1        PLAN:    She is on vitamin D2 weekly.  She can keep the cam boot off at night for comfort and rest.  Otherwise I do not see any other problems therapy feels that she needs to be in therapy another 2-3 more weeks.      Electronically signed by: Michael Duane Johnson, CNP

## 2021-06-18 NOTE — LETTER
Letter by Johnson, Michael Duane, CNP at      Author: Johnson, Michael Duane, CNP Service: -- Author Type: --    Filed:  Encounter Date: 2/8/2019 Status: (Other)         Patient: Wendie Castellanos   MR Number: [id number]   YOB: 1929   Date of Visit: 2/8/2019     [facility]    Facility:   [facility] [[id number]]   Code Status: FULL CODE      CHIEF COMPLAINT/REASON FOR VISIT:  Chief Complaint   Patient presents with   ? Follow Up     rehab, tib       HISTORY:      HPI: Wendie is a 89 y.o. female who I had the chance and pleasure to revisit with secondary to her hospitalization January 7 - January 11, 2019 secondary to a fall sustaining a spiral fracture of the tibia status post IM nail.  She does have hypertension recently added clonidine 0.1 mg daily since most of her blood pressures have been running 140-170 systolically and also currently is on quinapril 40 mg daily and blood pressures have slightly improved we will keep a close eye on that she otherwise has been asymptomatic.  Blood sugars less than 190.  Is performing in therapy in talking to the therapy group she is weightbearing as tolerated they are looking at perhaps long-term care may be another 1 or 2 more weeks.  Appetite is good.  Sleeping well at night.  Does take melatonin.  He did not have any other particular complaints or other problems.  She did see orthopedics last on January 22    Past Medical History:   Diagnosis Date   ? Branch retinal vein occlusion with macular edema of left eye    ? Closed fracture of shaft of left tibia    ? DDD (degenerative disc disease), lumbar    ? Depression    ? DM2 (diabetes mellitus, type 2) (H)    ? LTBI (latent tuberculosis infection)    ? Morbid obesity (H)    ? Nonproliferative diabetic retinopathy of both eyes (H)    ? Ocular hypertension    ? Pseudophakia of both eyes    ? Urinary incontinence              Family History   Problem Relation Age of Onset   ?  Heart disease Mother         MI   ? Heart disease Father         MI   ? Glaucoma Brother         2 brothers    ? Diabetes Brother    ? Diabetes Brother    ? Diabetes Brother    ? Heart disease Brother    ? Heart disease Brother    ? Heart disease Brother      Social History     Socioeconomic History   ? Marital status:      Spouse name: Not on file   ? Number of children: Not on file   ? Years of education: Not on file   ? Highest education level: Not on file   Social Needs   ? Financial resource strain: Not on file   ? Food insecurity - worry: Not on file   ? Food insecurity - inability: Not on file   ? Transportation needs - medical: Not on file   ? Transportation needs - non-medical: Not on file   Occupational History   ? Not on file   Tobacco Use   ? Smoking status: Never Smoker   ? Smokeless tobacco: Never Used   Substance and Sexual Activity   ? Alcohol use: No   ? Drug use: No   ? Sexual activity: No   Other Topics Concern   ? Not on file   Social History Narrative   ? Not on file         Review of Systems  Currently she denies any chills or fever coughing wheezing chest pain dizziness or vertigo nausea vomiting diarrhea dysuria flulike symptoms headache stiff neck or swollen glands.  History of lumbar degenerative disc disease history of falls chronic pain depression diabetes and recent left tibial fracture     Current Outpatient Medications   Medication Sig   ? aspirin 81 mg chewable tablet Chew 81 mg daily.   ? cloNIDine HCl (CATAPRES) 0.1 MG tablet Take 0.1 mg by mouth daily.   ? FLUoxetine (PROZAC) 20 MG capsule Take 40 mg by mouth daily.   ? gabapentin (NEURONTIN) 300 MG capsule Take 300 mg by mouth daily.   ? insulin aspart protamine-insulin aspart (NOVOLOG/HUMALOG 70-30 FLEXPEN) 100 unit/mL (70-30) pen Inject 15 Units under the skin 2 (two) times a day before meals.   ? melatonin 3 mg Tab tablet Take 3 mg by mouth at bedtime.   ? metFORMIN (GLUCOPHAGE-XR) 500 MG 24 hr tablet Take 1,000 mg by  mouth daily with supper.   ? quinapril (ACCUPRIL) 20 MG tablet Take 40 mg by mouth daily.   ? simvastatin (ZOCOR) 20 MG tablet Take 20 mg by mouth at bedtime.       .There were no vitals filed for this visit.  Blood pressure 139/66 pulse 71 respirations 18 temperature 97.2  Physical Exam  Constitutional: No distress.   Cardiovascular: Normal rate, regular rhythm and normal heart sounds.   Pulmonary/Chest: Breath sounds normal.   Abdominal: Bowel sounds are normal.   Musculoskeletal:   Recent left tibial fracture secondary to fall status post IM nail.  History of lumbar degenerative disc disease.  History of falls .  Cam boot.  The surgical scars are healing.Neurological: She is alert.   Psychiatric: Her behavior is normal.       LABS:   Lab Results   Component Value Date    WBC 6.9 08/16/2010    HGB 13.9 08/16/2010    HCT 40.7 08/16/2010    MCV 90 08/16/2010     08/16/2010     Results for orders placed or performed during the hospital encounter of 08/16/10   Basic Metabolic Panel   Result Value Ref Range    Sodium 138 136 - 145 mmol/L    Potassium 3.6 3.5 - 5.0 mmol/L    CO2 29 25 - 31 mmol/L    Chloride 101 98 - 107 mmol/L    Anion Gap, Calculation 8 5 - 18 mmol/L    BUN 11 8 - 28 mg/dL    Creatinine 0.90 0.60 - 1.10 mg/dL    GFR MDRD Non Af Amer >60 >60 ml/min/1.73m2    GFR MDRD Af Amer >60 >60 ml/min/1.73m2    Glucose 183 (H) 70 - 125 mg/dL    Calcium 9.5 8.5 - 10.5 mg/dL           ASSESSMENT:      ICD-10-CM    1. Essential hypertension I10    2. Type 1 diabetes mellitus without complication (H) E10.9    3. DDD (degenerative disc disease), lumbar M51.36    4. Closed fracture of proximal end of left fibula with routine healing, unspecified fracture morphology, subsequent encounter S82.222D        PLAN:    Continue with monitoring the blood pressures.  Recent addition of clonidine 0.1 mg.  Continue to work with therapy group.  Looks like another week or 2 and perhaps going into long-term care.  Last visit  with orthopedics was January 22.      Electronically signed by: Michael Duane Johnson, LUIS

## 2021-06-18 NOTE — LETTER
Letter by Johnson, Michael Duane, CNP at      Author: Johnson, Michael Duane, CNP Service: -- Author Type: --    Filed:  Encounter Date: 1/15/2019 Status: (Other)         Patient: Wendie Castellanos   MR Number: [id number]   YOB: 1929   Date of Visit: 1/15/2019     [facility]    Facility:   [facility] [[id number]]   Code Status: FULL CODE      CHIEF COMPLAINT/REASON FOR VISIT:  Chief Complaint   Patient presents with   ? Problem Visit     asked to see       HISTORY:      HPI: Wendie is a 89 y.o. female who I was asked to see secondary to most recent admission to the transitional care unit but also most recent hospitalization January 7 - January 11, 2019 secondary to a fall while trying to get off the toilet earlier in the day.  She has a history of right leg pain and weakness chronically due to lumbar degenerative disc disease.  She hit her left leg on the toilet.  The x-ray did show a spiral fracture of the tibia and she went for surgery on January 8, 2019.  She also does have a history of diabetes we did a chance to address that.  She is currently on insulin 15 units twice daily 7030 mix along with metformin thousand milligrams daily and blood sugars have been pretty much less than 170 there was one at 250 4P most recently was having issues with sleep melatonin was initially instituted she did sleep pretty good last night.  Regarding her osteoporosis she does have a vitamin D level ordered we will wait for those results.  She also needs a follow-up with orthopedics in a couple of weeks.  She has been a little high in the blood pressures running anywhere systolically 140-160 and at that point we will make a decision on any further increase in her blood pressure medications.  For neuropathy is on gabapentin 100 mg daily.  Appetite good.  Denies any bowel or bladder problems.  He also do need to check and recheck a hemoglobin.    No past medical history on  file.          No family history on file.  Social History     Socioeconomic History   ? Marital status:      Spouse name: Not on file   ? Number of children: Not on file   ? Years of education: Not on file   ? Highest education level: Not on file   Social Needs   ? Financial resource strain: Not on file   ? Food insecurity - worry: Not on file   ? Food insecurity - inability: Not on file   ? Transportation needs - medical: Not on file   ? Transportation needs - non-medical: Not on file   Occupational History   ? Not on file   Tobacco Use   ? Smoking status: Not on file   Substance and Sexual Activity   ? Alcohol use: Not on file   ? Drug use: Not on file   ? Sexual activity: Not on file   Other Topics Concern   ? Not on file   Social History Narrative   ? Not on file         Review of Systems  Currently she denies any chills or fever coughing wheezing chest pain dizziness or vertigo nausea vomiting diarrhea dysuria flulike symptoms headache stiff neck or swollen glands.  History of lumbar degenerative disc disease history of falls chronic pain depression diabetes and recent left tibial fracture  No current outpatient medications on file.       .There were no vitals filed for this visit.  Blood pressure 168/74 pulse 77 respiration 16 temperature 98.2 saturation room air 93%  Physical Exam   Constitutional: No distress.   HENT:   Head: Normocephalic.   Eyes: Pupils are equal, round, and reactive to light.   Neck: Neck supple. No thyromegaly present.   Cardiovascular: Normal rate, regular rhythm and normal heart sounds.   Pulmonary/Chest: Breath sounds normal.   Abdominal: Bowel sounds are normal. There is no tenderness. There is no guarding.   Musculoskeletal:   Recent left tibial fracture secondary to fall status post IM nail.  History of lumbar degenerative disc disease.  History of falls   Lymphadenopathy:     She has no cervical adenopathy.   Neurological: She is alert.   Skin: Skin is warm and dry. No rash  noted.   Psychiatric: Her behavior is normal.         LABS:   Lab Results   Component Value Date    WBC 6.9 08/16/2010    HGB 13.9 08/16/2010    HCT 40.7 08/16/2010    MCV 90 08/16/2010     08/16/2010     Results for orders placed or performed during the hospital encounter of 08/16/10   Basic Metabolic Panel   Result Value Ref Range    Sodium 138 136 - 145 mmol/L    Potassium 3.6 3.5 - 5.0 mmol/L    CO2 29 25 - 31 mmol/L    Chloride 101 98 - 107 mmol/L    Anion Gap, Calculation 8 5 - 18 mmol/L    BUN 11 8 - 28 mg/dL    Creatinine 0.90 0.60 - 1.10 mg/dL    GFR MDRD Non Af Amer >60 >60 ml/min/1.73m2    GFR MDRD Af Amer >60 >60 ml/min/1.73m2    Glucose 183 (H) 70 - 125 mg/dL    Calcium 9.5 8.5 - 10.5 mg/dL       No results found for: HGBA1C      ASSESSMENT:      ICD-10-CM    1. Closed fracture of proximal end of left fibula with routine healing, unspecified fracture morphology, subsequent encounter S82.832D    2. DDD (degenerative disc disease), lumbar M51.36    3. Type 1 diabetes mellitus without complication (H) E10.9    4. Essential hypertension I10        PLAN:    She does have a vitamin D already ordered we will also do some daily weights she is also recently started on melatonin 3 mg for sleep.  Follow-up with orthopedics in about 2 weeks.  Did talk about the rehabilitation process and working out and trying to get her up and about as soon as possible.  At this point did not have any other questions she did appreciate visit as well as discussing the transitional care unit.          Electronically signed by: Michael Duane Johnson, CNP

## 2021-06-18 NOTE — LETTER
Letter by Johnson, Michael Duane, CNP at      Author: Johnson, Michael Duane, CNP Service: -- Author Type: --    Filed:  Encounter Date: 1/22/2019 Status: (Other)         Patient: Wendie Castellanos   MR Number: [id number]   YOB: 1929   Date of Visit: 1/22/2019     [facility]    Facility:   [facility] [[id number]]   Code Status: FULL CODE      CHIEF COMPLAINT/REASON FOR VISIT:  Chief Complaint   Patient presents with   ? Follow Up     rehab, tib fx       HISTORY:      HPI: Wendie is a 89 y.o. female who I had the pleasure and chance to revisit with secondary to her hospitalization January 7 of January 11, 2019 secondary to a fall sustaining a spiral fracture of the tibia status post IM nail.  He did see orthopedics today took the stitches out she is able to ambulate she does have a cam boot.  Pain is managed.  Normotensive and afebrile.  Blood sugars for the most part less than 180.  She is on gabapentin for the pain vitamin D for her bones and otherwise sleeping well at night using melatonin 3 mg.  Appetite is still unremarkable and doing well and she is trying to work out with therapy department.    Past Medical History:   Diagnosis Date   ? Branch retinal vein occlusion with macular edema of left eye    ? Closed fracture of shaft of left tibia    ? DDD (degenerative disc disease), lumbar    ? Depression    ? DM2 (diabetes mellitus, type 2) (H)    ? LTBI (latent tuberculosis infection)    ? Morbid obesity (H)    ? Nonproliferative diabetic retinopathy of both eyes (H)    ? Ocular hypertension    ? Pseudophakia of both eyes    ? Urinary incontinence              Family History   Problem Relation Age of Onset   ? Heart disease Mother         MI   ? Heart disease Father         MI   ? Glaucoma Brother         2 brothers    ? Diabetes Brother    ? Diabetes Brother    ? Diabetes Brother    ? Heart disease Brother    ? Heart disease Brother    ? Heart disease  Brother      Social History     Socioeconomic History   ? Marital status:      Spouse name: Not on file   ? Number of children: Not on file   ? Years of education: Not on file   ? Highest education level: Not on file   Social Needs   ? Financial resource strain: Not on file   ? Food insecurity - worry: Not on file   ? Food insecurity - inability: Not on file   ? Transportation needs - medical: Not on file   ? Transportation needs - non-medical: Not on file   Occupational History   ? Not on file   Tobacco Use   ? Smoking status: Never Smoker   ? Smokeless tobacco: Never Used   Substance and Sexual Activity   ? Alcohol use: No   ? Drug use: No   ? Sexual activity: No   Other Topics Concern   ? Not on file   Social History Narrative   ? Not on file         Review of Systems  Currently she denies any chills or fever coughing wheezing chest pain dizziness or vertigo nausea vomiting diarrhea dysuria flulike symptoms headache stiff neck or swollen glands.  History of lumbar degenerative disc disease history of falls chronic pain depression diabetes and recent left tibial fracture     .There were no vitals filed for this visit.  Blood pressure 148/79 pulse 74 respirations 18 temperature 96.6 saturation room air 95%  Physical Exam   Constitutional: No distress.   Neck: Neck supple. No thyromegaly present.   Cardiovascular: Normal rate, regular rhythm and normal heart sounds.   Pulmonary/Chest: Breath sounds normal.   Abdominal: Bowel sounds are normal. There is no tenderness. There is no guarding.   Musculoskeletal:   Recent left tibial fracture secondary to fall status post IM nail.  History of lumbar degenerative disc disease.  History of falls .  Cam boot  Lymphadenopathy:     She has no cervical adenopathy.   Neurological: She is alert.   Psychiatric: Her behavior is normal.        Lab Results   Component Value Date    WBC 6.9 08/16/2010    HGB 13.9 08/16/2010    HCT 40.7 08/16/2010    MCV 90 08/16/2010      08/16/2010     Results for orders placed or performed during the hospital encounter of 08/16/10   Basic Metabolic Panel   Result Value Ref Range    Sodium 138 136 - 145 mmol/L    Potassium 3.6 3.5 - 5.0 mmol/L    CO2 29 25 - 31 mmol/L    Chloride 101 98 - 107 mmol/L    Anion Gap, Calculation 8 5 - 18 mmol/L    BUN 11 8 - 28 mg/dL    Creatinine 0.90 0.60 - 1.10 mg/dL    GFR MDRD Non Af Amer >60 >60 ml/min/1.73m2    GFR MDRD Af Amer >60 >60 ml/min/1.73m2    Glucose 183 (H) 70 - 125 mg/dL    Calcium 9.5 8.5 - 10.5 mg/dL       No results found for: HGBA1C     LABS:   Lab Results   Component Value Date    WBC 6.9 08/16/2010    HGB 13.9 08/16/2010    HCT 40.7 08/16/2010    MCV 90 08/16/2010     08/16/2010         ASSESSMENT:      ICD-10-CM    1. Closed fracture of proximal end of left fibula with routine healing, unspecified fracture morphology, subsequent encounter S82.832D    2. DDD (degenerative disc disease), lumbar M51.36    3. Essential hypertension I10    4. Type 1 diabetes mellitus without complication (H) E10.9        PLAN:    No other changes with this visit.  She did see orthopedics today things seem to be going in the right direction she will continue to work with therapy department and does feel comfortable with the progress that she is made in therapy also feels her probably be another week or so.      Electronically signed by: Michael Duane Johnson, CNP

## 2021-06-18 NOTE — LETTER
Letter by Johnson, Michael Duane, CNP at      Author: Johnson, Michael Duane, CNP Service: -- Author Type: --    Filed:  Encounter Date: 1/29/2019 Status: (Other)         Patient: Wendie Castellanos   MR Number: [id number]   YOB: 1929   Date of Visit: 1/29/2019     [facility]    [facility]ility[facility]   [facility] [[id number]]   Code Status: FULL CODE      CHIEF COMPLAINT/REASON FOR VISIT:  Chief Complaint   Patient presents with   ? Follow Up     rehab, tib fx       HISTORY:      HPI: Wendie is a 89 y.o. female who I had the pleasure to revisit with secondary to her hospitalization January 7 of January 11, 2019 secondary to a fall sustaining a spiral fracture of the tibia status post IM nail.  Blood sugars have been anywhere from 123-207 most of less than 180 currently on 70/30 insulin 15 units twice daily along with metformin.  For sleep she does take melatonin 3 mg.  She has been normotensive and afebrile.  Therapy does feel like they need another 2 maybe 3 weeks with there is actually looking for a long-term care facility.  Recently did see orthopedics for a recheck on January 12.  She did not have any other particular questions her legs are dry we will get her some Aquaphor lotion twice daily.  Her moods have been stable her pain and neuropathy she is on gabapentin 300 mg daily not requiring any Tylenol.    Past Medical History:   Diagnosis Date   ? Branch retinal vein occlusion with macular edema of left eye    ? Closed fracture of shaft of left tibia    ? DDD (degenerative disc disease), lumbar    ? Depression    ? DM2 (diabetes mellitus, type 2) (H)    ? LTBI (latent tuberculosis infection)    ? Morbid obesity (H)    ? Nonproliferative diabetic retinopathy of both eyes (H)    ? Ocular hypertension    ? Pseudophakia of both eyes    ? Urinary incontinence              Family History   Problem Relation Age of Onset   ? Heart disease Mother         MI   ? Heart  disease Father         MI   ? Glaucoma Brother         2 brothers    ? Diabetes Brother    ? Diabetes Brother    ? Diabetes Brother    ? Heart disease Brother    ? Heart disease Brother    ? Heart disease Brother      Social History     Socioeconomic History   ? Marital status:      Spouse name: Not on file   ? Number of children: Not on file   ? Years of education: Not on file   ? Highest education level: Not on file   Social Needs   ? Financial resource strain: Not on file   ? Food insecurity - worry: Not on file   ? Food insecurity - inability: Not on file   ? Transportation needs - medical: Not on file   ? Transportation needs - non-medical: Not on file   Occupational History   ? Not on file   Tobacco Use   ? Smoking status: Never Smoker   ? Smokeless tobacco: Never Used   Substance and Sexual Activity   ? Alcohol use: No   ? Drug use: No   ? Sexual activity: No   Other Topics Concern   ? Not on file   Social History Narrative   ? Not on file         Review of Systems  Currently she denies any chills or fever coughing wheezing chest pain dizziness or vertigo nausea vomiting diarrhea dysuria flulike symptoms headache stiff neck or swollen glands.  History of lumbar degenerative disc disease history of falls chronic pain depression diabetes and recent left tibial fracture    Current Outpatient Medications   Medication Sig   ? aspirin 81 mg chewable tablet Chew 81 mg daily.   ? FLUoxetine (PROZAC) 20 MG capsule Take 40 mg by mouth daily.   ? gabapentin (NEURONTIN) 300 MG capsule Take 300 mg by mouth daily.   ? insulin aspart protamine-insulin aspart (NOVOLOG/HUMALOG 70-30 FLEXPEN) 100 unit/mL (70-30) pen Inject 15 Units under the skin 2 (two) times a day before meals.   ? melatonin 3 mg Tab tablet Take 3 mg by mouth at bedtime.   ? metFORMIN (GLUCOPHAGE-XR) 500 MG 24 hr tablet Take 1,000 mg by mouth daily with supper.   ? quinapril (ACCUPRIL) 20 MG tablet Take 40 mg by mouth daily.   ? simvastatin (ZOCOR) 20  MG tablet Take 20 mg by mouth at bedtime.       .There were no vitals filed for this visit.  Blood pressure 149/72 pulse 81 respirations 18 temperature 97.5 saturation room air 92%  Physical Exam    Constitutional: No distress.   Cardiovascular: Normal rate, regular rhythm and normal heart sounds.   Pulmonary/Chest: Breath sounds normal.   Abdominal: Bowel sounds are normal. There is no tenderness. There is no guarding.   Musculoskeletal:   Recent left tibial fracture secondary to fall status post IM nail.  History of lumbar degenerative disc disease.  History of falls .  Cam boot.  The surgical areas have Steri-Strips clean and dry.Neurological: She is alert.   Psychiatric: Her behavior is normal.      LABS:   No results found for: HGBA1C  Results for orders placed or performed during the hospital encounter of 08/16/10   Basic Metabolic Panel   Result Value Ref Range    Sodium 138 136 - 145 mmol/L    Potassium 3.6 3.5 - 5.0 mmol/L    CO2 29 25 - 31 mmol/L    Chloride 101 98 - 107 mmol/L    Anion Gap, Calculation 8 5 - 18 mmol/L    BUN 11 8 - 28 mg/dL    Creatinine 0.90 0.60 - 1.10 mg/dL    GFR MDRD Non Af Amer >60 >60 ml/min/1.73m2    GFR MDRD Af Amer >60 >60 ml/min/1.73m2    Glucose 183 (H) 70 - 125 mg/dL    Calcium 9.5 8.5 - 10.5 mg/dL       Lab Results   Component Value Date    WBC 6.9 08/16/2010    HGB 13.9 08/16/2010    HCT 40.7 08/16/2010    MCV 90 08/16/2010     08/16/2010         ASSESSMENT:      ICD-10-CM    1. Closed fracture of proximal end of left fibula with routine healing, unspecified fracture morphology, subsequent encounter S82.832D    2. Essential hypertension I10    3. Type 1 diabetes mellitus without complication (H) E10.9    4. Physical debility R53.81        PLAN:    No further changes at this time we will get her some Aquaphor lotion most her blood sugars less than 180.  Therapy does feel the need another 1-2 more weeks with her and perhaps at that point going to long-term  care.      Electronically signed by: Michael Duane Johnson, LUIS

## 2021-06-18 NOTE — LETTER
Letter by Johnson, Michael Duane, CNP at      Author: Johnson, Michael Duane, CNP Service: -- Author Type: --    Filed:  Encounter Date: 2/1/2019 Status: (Other)         Patient: Wendie Castellanos   MR Number: [id number]   YOB: 1929   Date of Visit: 2/1/2019     [facility]    Facility:   [facility] [[id number]]   Code Status: Full    CHIEF COMPLAINT/REASON FOR VISIT:  Chief Complaint   Patient presents with   ? Follow Up     rehab, ti b fx       HISTORY:      HPI: Wendie is a 89 y.o. female who I had the chance and pleasure to revisit with secondary to her hospitalization January 7 - January 11, 2019 secondary to a fall sustaining a spiral fracture of the tibia status post IM nail.  She currently has been doing well still complains of generalized weakness and leg weakness.  Is doing some therapy.  Her pain seems to be well managed at this time she is not requiring any Tylenol or narcotic analgesics she does take gabapentin 300 mg once daily for neuropathy.  She has been normotensive and afebrile.  Her blood sugars less than 180.  Appetite good.  Sleeping well at night.  In talking with the therapy department she is weightbearing as tolerated they are looking for long-term care looks like she will be here another week or 2.    Past Medical History:   Diagnosis Date   ? Branch retinal vein occlusion with macular edema of left eye    ? Closed fracture of shaft of left tibia    ? DDD (degenerative disc disease), lumbar    ? Depression    ? DM2 (diabetes mellitus, type 2) (H)    ? LTBI (latent tuberculosis infection)    ? Morbid obesity (H)    ? Nonproliferative diabetic retinopathy of both eyes (H)    ? Ocular hypertension    ? Pseudophakia of both eyes    ? Urinary incontinence              Family History   Problem Relation Age of Onset   ? Heart disease Mother         MI   ? Heart disease Father         MI   ? Glaucoma Brother         2 brothers    ? Diabetes  Brother    ? Diabetes Brother    ? Diabetes Brother    ? Heart disease Brother    ? Heart disease Brother    ? Heart disease Brother      Social History     Socioeconomic History   ? Marital status:      Spouse name: Not on file   ? Number of children: Not on file   ? Years of education: Not on file   ? Highest education level: Not on file   Social Needs   ? Financial resource strain: Not on file   ? Food insecurity - worry: Not on file   ? Food insecurity - inability: Not on file   ? Transportation needs - medical: Not on file   ? Transportation needs - non-medical: Not on file   Occupational History   ? Not on file   Tobacco Use   ? Smoking status: Never Smoker   ? Smokeless tobacco: Never Used   Substance and Sexual Activity   ? Alcohol use: No   ? Drug use: No   ? Sexual activity: No   Other Topics Concern   ? Not on file   Social History Narrative   ? Not on file         Review of Systems  Currently she denies any chills or fever coughing wheezing chest pain dizziness or vertigo nausea vomiting diarrhea dysuria flulike symptoms headache stiff neck or swollen glands.  History of lumbar degenerative disc disease history of falls chronic pain depression diabetes and recent left tibial fracture    Current Outpatient Medications   Medication Sig   ? aspirin 81 mg chewable tablet Chew 81 mg daily.   ? FLUoxetine (PROZAC) 20 MG capsule Take 40 mg by mouth daily.   ? gabapentin (NEURONTIN) 300 MG capsule Take 300 mg by mouth daily.   ? insulin aspart protamine-insulin aspart (NOVOLOG/HUMALOG 70-30 FLEXPEN) 100 unit/mL (70-30) pen Inject 15 Units under the skin 2 (two) times a day before meals.   ? melatonin 3 mg Tab tablet Take 3 mg by mouth at bedtime.   ? metFORMIN (GLUCOPHAGE-XR) 500 MG 24 hr tablet Take 1,000 mg by mouth daily with supper.   ? quinapril (ACCUPRIL) 20 MG tablet Take 40 mg by mouth daily.   ? simvastatin (ZOCOR) 20 MG tablet Take 20 mg by mouth at bedtime.   \    .There were no vitals filed  for this visit.  Blood pressure 148/76 pulse 76 respirations 18 temperature 96.9  Physical Exam     Constitutional: No distress.   Cardiovascular: Normal rate, regular rhythm and normal heart sounds.   Pulmonary/Chest: Breath sounds normal.   Abdominal: Bowel sounds are normal.   Musculoskeletal:   Recent left tibial fracture secondary to fall status post IM nail.  History of lumbar degenerative disc disease.  History of falls .  Cam boot.  The surgical scars are healing.  Lymphadenopathy:     She has no cervical adenopathy.   Neurological: She is alert.   Psychiatric: Her behavior is normal.     LABS:   Lab Results   Component Value Date    WBC 6.9 08/16/2010    HGB 13.9 08/16/2010    HCT 40.7 08/16/2010    MCV 90 08/16/2010     08/16/2010         ASSESSMENT:      ICD-10-CM    1. Closed fracture of proximal end of left fibula with routine healing, unspecified fracture morphology, subsequent encounter S82.832D    2. Type 1 diabetes mellitus without complication (H) E10.9    3. Leg weakness, bilateral R29.898    4. Pain management R52        PLAN:    No further changes at this time.  She is getting Aquaphor twice a day to her dry skin otherwise did encourage the rehabilitation process.  Looks like therapy wants her here another 2 weeks or so for the rehabilitation process.    .    Electronically signed by: Michael Duane Johnson, CNP

## 2021-06-18 NOTE — LETTER
Letter by Chester Moe MD at      Author: Chester Moe MD Service: -- Author Type: --    Filed:  Encounter Date: 2/11/2019 Status: (Other)         Patient: Wendie Castellanos   MR Number: [id number]   YOB: 1929   Date of Visit: 2/11/2019     Code Status:  FULL CODE  Visit Type: Problem Visit (Grieving /hypertension/nonweightbearing fracture)     Facility:  [facility] SNF [[id number]]        Facility Type: SNF (Skilled Nursing Facility, TCU)    History of Present Illness: Wendie Castellanos is a 89 y.o. female with a history of significant debility whose  who usually visits her every day developed a heart attack and is currently in the ICU.'s been very upsetting to her and she is not been able to do much.  She already is fairly sedentary and is not able to do weightbearing secondary to the tibial plateau fracture.  She reports poor sleep.  Nurse's interrogated and show me that she has had progressive blood pressure elevations despite the addition of clonidine 0.1 mg in the morning.  She also had a little bit of leg swelling and a few pound weight gain.  She does not comment on any of her concerns.  Staff reports that she has much is 6 more weeks and has been on a PC board for transferring but they feel that she is going to need long-term care prior to being able to go home because of the length of needing to be weightbearing as tolerated restriction.    Review of Systems     Physical Exam   Constitutional:   Patient has a flat affect and clearly is preoccupied with her 's health.  Her heart is nonrapid lungs are clear abdomen is soft.  She does have some pain that she identifies on the left leg.  No significant swelling there however.   Nursing note and vitals reviewed.      Labs:  All labs reviewed in the nursing home record.    Assessment:  1. Closed fracture of proximal end of left fibula with routine healing, unspecified fracture morphology, subsequent  encounter     2. Physical debility     3. Leg weakness, bilateral     4. Hypertension, unspecified type     5. Insomnia, unspecified type         Plan: At this juncture I am concerned about both her blood pressure and her overall mental health we are going to add trazodone 25 at at bedtime this will be both for an antidepressant standpoint and reducing anxiety as well as eating and sleep.  We will add hydrochlorothiazide 25 daily this may help diurese some of the swelling is noted particularly on her fractured side and evaluate the blood pressure.  We will follow-up carefully on the results of the above      25 minutes spent of which greater than 55% was face to face communication with the patient about above plan of care    Electronically signed by: Chester Moe MD

## 2021-06-23 NOTE — PROGRESS NOTES
[facility]    Facility:   [facility] [[id number]]   Code Status: FULL CODE      CHIEF COMPLAINT/REASON FOR VISIT:  Chief Complaint   Patient presents with     Follow Up     rehab, tib ayala       HISTORY:      HPI: Wendie is a 89 y.o. female who I had the pleasure and chance to revisit with secondary to her hospitalization January 7 of January 11, 2019 secondary to a fall sustaining a spiral fracture of the tibia status post IM nail.  He did see orthopedics today took the stitches out she is able to ambulate she does have a cam boot.  Pain is managed.  Normotensive and afebrile.  Blood sugars for the most part less than 180.  She is on gabapentin for the pain vitamin D for her bones and otherwise sleeping well at night using melatonin 3 mg.  Appetite is still unremarkable and doing well and she is trying to work out with therapy department.    Past Medical History:   Diagnosis Date     Branch retinal vein occlusion with macular edema of left eye      Closed fracture of shaft of left tibia      DDD (degenerative disc disease), lumbar      Depression      DM2 (diabetes mellitus, type 2) (H)      LTBI (latent tuberculosis infection)      Morbid obesity (H)      Nonproliferative diabetic retinopathy of both eyes (H)      Ocular hypertension      Pseudophakia of both eyes      Urinary incontinence              Family History   Problem Relation Age of Onset     Heart disease Mother         MI     Heart disease Father         MI     Glaucoma Brother         2 brothers      Diabetes Brother      Diabetes Brother      Diabetes Brother      Heart disease Brother      Heart disease Brother      Heart disease Brother      Social History     Socioeconomic History     Marital status:      Spouse name: Not on file     Number of children: Not on file     Years of education: Not on file     Highest education level: Not on file   Social Needs     Financial resource strain: Not on  file     Food insecurity - worry: Not on file     Food insecurity - inability: Not on file     Transportation needs - medical: Not on file     Transportation needs - non-medical: Not on file   Occupational History     Not on file   Tobacco Use     Smoking status: Never Smoker     Smokeless tobacco: Never Used   Substance and Sexual Activity     Alcohol use: No     Drug use: No     Sexual activity: No   Other Topics Concern     Not on file   Social History Narrative     Not on file         Review of Systems  Currently she denies any chills or fever coughing wheezing chest pain dizziness or vertigo nausea vomiting diarrhea dysuria flulike symptoms headache stiff neck or swollen glands.  History of lumbar degenerative disc disease history of falls chronic pain depression diabetes and recent left tibial fracture     .There were no vitals filed for this visit.  Blood pressure 148/79 pulse 74 respirations 18 temperature 96.6 saturation room air 95%  Physical Exam   Constitutional: No distress.   Neck: Neck supple. No thyromegaly present.   Cardiovascular: Normal rate, regular rhythm and normal heart sounds.   Pulmonary/Chest: Breath sounds normal.   Abdominal: Bowel sounds are normal. There is no tenderness. There is no guarding.   Musculoskeletal:   Recent left tibial fracture secondary to fall status post IM nail.  History of lumbar degenerative disc disease.  History of falls .  Cam boot  Lymphadenopathy:     She has no cervical adenopathy.   Neurological: She is alert.   Psychiatric: Her behavior is normal.        Lab Results   Component Value Date    WBC 6.9 08/16/2010    HGB 13.9 08/16/2010    HCT 40.7 08/16/2010    MCV 90 08/16/2010     08/16/2010     Results for orders placed or performed during the hospital encounter of 08/16/10   Basic Metabolic Panel   Result Value Ref Range    Sodium 138 136 - 145 mmol/L    Potassium 3.6 3.5 - 5.0 mmol/L    CO2 29 25 - 31 mmol/L    Chloride 101 98 - 107 mmol/L    Anion  Gap, Calculation 8 5 - 18 mmol/L    BUN 11 8 - 28 mg/dL    Creatinine 0.90 0.60 - 1.10 mg/dL    GFR MDRD Non Af Amer >60 >60 ml/min/1.73m2    GFR MDRD Af Amer >60 >60 ml/min/1.73m2    Glucose 183 (H) 70 - 125 mg/dL    Calcium 9.5 8.5 - 10.5 mg/dL       No results found for: HGBA1C     LABS:   Lab Results   Component Value Date    WBC 6.9 08/16/2010    HGB 13.9 08/16/2010    HCT 40.7 08/16/2010    MCV 90 08/16/2010     08/16/2010         ASSESSMENT:      ICD-10-CM    1. Closed fracture of proximal end of left fibula with routine healing, unspecified fracture morphology, subsequent encounter S82.832D    2. DDD (degenerative disc disease), lumbar M51.36    3. Essential hypertension I10    4. Type 1 diabetes mellitus without complication (H) E10.9        PLAN:    No other changes with this visit.  She did see orthopedics today things seem to be going in the right direction she will continue to work with therapy department and does feel comfortable with the progress that she is made in therapy also feels her probably be another week or so.      Electronically signed by: Michael Duane Johnson, CNP

## 2021-06-23 NOTE — PROGRESS NOTES
Code Status:  FULL CODE  Visit Type: Problem Visit (Grieving /hypertension/nonweightbearing fracture)     Facility:  [facility] [[id number]]        Facility Type: SNF (Skilled Nursing Facility, TCU)    History of Present Illness: Wendie Castellanos is a 89 y.o. female with a history of significant debility whose  who usually visits her every day developed a heart attack and is currently in the ICU.'s been very upsetting to her and she is not been able to do much.  She already is fairly sedentary and is not able to do weightbearing secondary to the tibial plateau fracture.  She reports poor sleep.  Nurse's interrogated and show me that she has had progressive blood pressure elevations despite the addition of clonidine 0.1 mg in the morning.  She also had a little bit of leg swelling and a few pound weight gain.  She does not comment on any of her concerns.  Staff reports that she has much is 6 more weeks and has been on a PC board for transferring but they feel that she is going to need long-term care prior to being able to go home because of the length of needing to be weightbearing as tolerated restriction.    Review of Systems     Physical Exam   Constitutional:   Patient has a flat affect and clearly is preoccupied with her 's health.  Her heart is nonrapid lungs are clear abdomen is soft.  She does have some pain that she identifies on the left leg.  No significant swelling there however.   Nursing note and vitals reviewed.      Labs:  All labs reviewed in the nursing home record.    Assessment:  1. Closed fracture of proximal end of left fibula with routine healing, unspecified fracture morphology, subsequent encounter     2. Physical debility     3. Leg weakness, bilateral     4. Hypertension, unspecified type     5. Insomnia, unspecified type         Plan: At this juncture I am concerned about both her blood pressure and her overall mental health we are going to add  trazodone 25 at at bedtime this will be both for an antidepressant standpoint and reducing anxiety as well as eating and sleep.  We will add hydrochlorothiazide 25 daily this may help diurese some of the swelling is noted particularly on her fractured side and evaluate the blood pressure.  We will follow-up carefully on the results of the above      25 minutes spent of which greater than 55% was face to face communication with the patient about above plan of care    Electronically signed by: Chester Moe MD

## 2021-06-23 NOTE — PROGRESS NOTES
[facility]    Facility:   [facility] [[id number]]   Code Status: FULL CODE      CHIEF COMPLAINT/REASON FOR VISIT:  Chief Complaint   Patient presents with     Problem Visit     asked to see       HISTORY:      HPI: Wendie is a 89 y.o. female who I was asked to see secondary to most recent admission to the transitional care unit but also most recent hospitalization January 7 - January 11, 2019 secondary to a fall while trying to get off the toilet earlier in the day.  She has a history of right leg pain and weakness chronically due to lumbar degenerative disc disease.  She hit her left leg on the toilet.  The x-ray did show a spiral fracture of the tibia and she went for surgery on January 8, 2019.  She also does have a history of diabetes we did a chance to address that.  She is currently on insulin 15 units twice daily 7030 mix along with metformin thousand milligrams daily and blood sugars have been pretty much less than 170 there was one at 250 4P most recently was having issues with sleep melatonin was initially instituted she did sleep pretty good last night.  Regarding her osteoporosis she does have a vitamin D level ordered we will wait for those results.  She also needs a follow-up with orthopedics in a couple of weeks.  She has been a little high in the blood pressures running anywhere systolically 140-160 and at that point we will make a decision on any further increase in her blood pressure medications.  For neuropathy is on gabapentin 100 mg daily.  Appetite good.  Denies any bowel or bladder problems.  He also do need to check and recheck a hemoglobin.    No past medical history on file.          No family history on file.  Social History     Socioeconomic History     Marital status:      Spouse name: Not on file     Number of children: Not on file     Years of education: Not on file     Highest education level: Not on file   Social Needs      Financial resource strain: Not on file     Food insecurity - worry: Not on file     Food insecurity - inability: Not on file     Transportation needs - medical: Not on file     Transportation needs - non-medical: Not on file   Occupational History     Not on file   Tobacco Use     Smoking status: Not on file   Substance and Sexual Activity     Alcohol use: Not on file     Drug use: Not on file     Sexual activity: Not on file   Other Topics Concern     Not on file   Social History Narrative     Not on file         Review of Systems  Currently she denies any chills or fever coughing wheezing chest pain dizziness or vertigo nausea vomiting diarrhea dysuria flulike symptoms headache stiff neck or swollen glands.  History of lumbar degenerative disc disease history of falls chronic pain depression diabetes and recent left tibial fracture  No current outpatient medications on file.       .There were no vitals filed for this visit.  Blood pressure 168/74 pulse 77 respiration 16 temperature 98.2 saturation room air 93%  Physical Exam   Constitutional: No distress.   HENT:   Head: Normocephalic.   Eyes: Pupils are equal, round, and reactive to light.   Neck: Neck supple. No thyromegaly present.   Cardiovascular: Normal rate, regular rhythm and normal heart sounds.   Pulmonary/Chest: Breath sounds normal.   Abdominal: Bowel sounds are normal. There is no tenderness. There is no guarding.   Musculoskeletal:   Recent left tibial fracture secondary to fall status post IM nail.  History of lumbar degenerative disc disease.  History of falls   Lymphadenopathy:     She has no cervical adenopathy.   Neurological: She is alert.   Skin: Skin is warm and dry. No rash noted.   Psychiatric: Her behavior is normal.         LABS:   Lab Results   Component Value Date    WBC 6.9 08/16/2010    HGB 13.9 08/16/2010    HCT 40.7 08/16/2010    MCV 90 08/16/2010     08/16/2010     Results for orders placed or performed during the hospital  encounter of 08/16/10   Basic Metabolic Panel   Result Value Ref Range    Sodium 138 136 - 145 mmol/L    Potassium 3.6 3.5 - 5.0 mmol/L    CO2 29 25 - 31 mmol/L    Chloride 101 98 - 107 mmol/L    Anion Gap, Calculation 8 5 - 18 mmol/L    BUN 11 8 - 28 mg/dL    Creatinine 0.90 0.60 - 1.10 mg/dL    GFR MDRD Non Af Amer >60 >60 ml/min/1.73m2    GFR MDRD Af Amer >60 >60 ml/min/1.73m2    Glucose 183 (H) 70 - 125 mg/dL    Calcium 9.5 8.5 - 10.5 mg/dL       No results found for: HGBA1C      ASSESSMENT:      ICD-10-CM    1. Closed fracture of proximal end of left fibula with routine healing, unspecified fracture morphology, subsequent encounter S82.832D    2. DDD (degenerative disc disease), lumbar M51.36    3. Type 1 diabetes mellitus without complication (H) E10.9    4. Essential hypertension I10        PLAN:    She does have a vitamin D already ordered we will also do some daily weights she is also recently started on melatonin 3 mg for sleep.  Follow-up with orthopedics in about 2 weeks.  Did talk about the rehabilitation process and working out and trying to get her up and about as soon as possible.  At this point did not have any other questions she did appreciate visit as well as discussing the transitional care unit.          Electronically signed by: Michael Duane Johnson, CNP

## 2021-06-23 NOTE — PROGRESS NOTES
Code Status:  FULL CODE  Visit Type: H & P     Facility:  [facility] [[id number]]      Facility Type: SNF (Skilled Nursing Facility, TCU)    History of Present Illness:   Hospital Admission Date: January 7, 2019 [facility] Discharge Date: January 11, 2019  Facility Admission Date: January 11th [address] [address] [facility] transitional care unit    Wendie Castellanos is a 89 y.o. female who is somewhat sedentary and morbidly obese with the diabetes and lumbar arthritis who fell while she was trying to get off the toilet prior to the day of admission.  She normally has right leg pain and weakness chronically due to the lumbar disc disease.  Her leg gave out and she hit her left leg on the toilet.  Her  was there at the time.  She was brought to the hospital.    Hospital course; the x-ray showed a nondisplaced is closed a spiral fracture of the tibia and orthopedics recommended a surgical treatment she went to the operating room on 1/8/19.  Surgery went well without complication and she was a somewhat sedentary and it did not get out of bed much on the ninth and because of this orthopedics recommended a TCU placement.    She was on less insulin in the hospital as her diet perhaps was better.  She comes to us on NovoLog 7030 at 15 units twice daily and gabapentin 300 at at bedtime Prozac 22 tabs every morning aspirin 81 daily metformin 500 daily quinapril 20 mg daily.    No past medical history on file.  No past surgical history on file.  No family history on file.  Social History     Socioeconomic History     Marital status:      Spouse name: Not on file     Number of children: Not on file     Years of education: Not on file     Highest education level: Not on file   Social Needs     Financial resource strain: Not on file     Food insecurity - worry: Not on file     Food insecurity - inability: Not on file     Transportation needs - medical: Not on file     Transportation needs -  non-medical: Not on file   Occupational History     Not on file   Tobacco Use     Smoking status: Not on file   Substance and Sexual Activity     Alcohol use: Not on file     Drug use: Not on file     Sexual activity: Not on file   Other Topics Concern     Not on file   Social History Narrative     Not on file     Additional Geriatric Review patient lives with her  she is very sedentary.  Has 3 boys and a girl all live locally.  Is independent in functioning and activities of daily living.  No current outpatient medications on file.     No current facility-administered medications for this visit.      Allergies not on file    There is no immunization history on file for this patient.      Review of Systems   Constitutional: Negative.  Negative for appetite change, chills, diaphoresis, fatigue, fever and unexpected weight change.   HENT: Negative for congestion, dental problem, ear pain, hearing loss, nosebleeds, sinus pressure, sore throat, tinnitus and trouble swallowing.    Eyes: Negative for photophobia, pain, discharge, itching and visual disturbance.   Respiratory: Negative for apnea, cough, chest tightness, shortness of breath and wheezing.    Cardiovascular: Negative for chest pain and palpitations.   Gastrointestinal: Negative for abdominal distention, abdominal pain, constipation and diarrhea.   Endocrine: Negative for cold intolerance, heat intolerance and polydipsia.   Genitourinary: Negative.  Negative for decreased urine volume, difficulty urinating, dysuria, enuresis, frequency, hematuria, menstrual problem, urgency and vaginal discharge.   Musculoskeletal: Negative for arthralgias, back pain and gait problem.   Allergic/Immunologic: Negative.    Neurological: Negative for dizziness, tremors, syncope, facial asymmetry, speech difficulty, weakness, light-headedness, numbness and headaches.   Hematological: Negative.    Psychiatric/Behavioral: Positive for sleep disturbance. Negative for  agitation, behavioral problems, confusion, decreased concentration, dysphoric mood and hallucinations. The patient is not hyperactive.         Physical Exam   Constitutional: She is oriented to person, place, and time. She appears well-developed and well-nourished.   Sugars to date all 100- 200   HENT:   Head: Normocephalic and atraumatic.   Right Ear: External ear normal.   Left Ear: External ear normal.   Nose: Nose normal.   Mouth/Throat: Oropharynx is clear and moist.   Eyes: Conjunctivae and EOM are normal. Pupils are equal, round, and reactive to light. Left eye exhibits no discharge. No scleral icterus.   Neck: Neck supple. No JVD present. No tracheal deviation present. No thyromegaly present.   Cardiovascular: Normal rate, regular rhythm and normal heart sounds. Exam reveals no friction rub.   No murmur heard.  Pulmonary/Chest: Effort normal and breath sounds normal. No respiratory distress. She has no wheezes. She has no rales. She exhibits no tenderness.   Abdominal: She exhibits no distension and no mass. There is no tenderness. There is no guarding.   Musculoskeletal: Normal range of motion. She exhibits no edema or tenderness.   Left leg is slightly more swollen than the right leg and was externally rotated.  No significant skin lesions.   Lymphadenopathy:     She has no cervical adenopathy.   Neurological: She is alert and oriented to person, place, and time. She has normal reflexes. No cranial nerve deficit. She exhibits normal muscle tone. Coordination normal.   Skin: Skin is warm and dry. No rash noted. No erythema.   Psychiatric: She has a normal mood and affect. Her behavior is normal. Thought content normal.   Vitals reviewed.        Labs:  All labs reviewed in the nursing home record.    Assessment:  1. Closed nondisplaced spiral fracture of shaft of left tibia, sequela     2. Osteoarthritis of lumbar spine, unspecified spinal osteoarthritis complication status     3. Morbid obesity with body  mass index (BMI) of 40.0 to 44.9 in adult (H)     4. Depression, unspecified depression type     5. Other chronic pain     6. Insulin-requiring or dependent type II diabetes mellitus (H)         Plan: Get a hemoglobin to check for post operative anemia #2 continue the AC twice daily monitoring for blood sugars #3 get a weight on the patient as we have not done that #4 get a vitamin D 25-hydroxy and hemoglobin to check for postoperative anemia and osteoporosis with vitamin D deficiency.  We will add melatonin 3 mg at at bedtime for her difficulty in getting to sleep.    Total 35 minutes of which 65% was spent in counseling and coordination of care of the above plan.    Electronically signed by: Chester Moe MD

## 2021-06-23 NOTE — PROGRESS NOTES
[facility]    Facility:   [facility] [place] [facility] [[id number]]   Code Status: FULL CODE      CHIEF COMPLAINT/REASON FOR VISIT:  Chief Complaint   Patient presents with     Follow Up     rehab, tib       HISTORY:      HPI: Wendie is a 89 y.o. female who I had the chance and pleasure to revisit with secondary to her hospitalization January 7 - January 11, 2019 secondary to a fall sustaining a spiral fracture of the tibia status post IM nail.  Did see orthopedics earlier this week they did remove her staples the Steri-Strips are clean and dry.  Does have a cam boot.  Good CMS to her foot.  For pain she can have gabapentin 300 mg in the morning and she has been participating in therapy although therapy does state that she is weightbearing but they do feel the need a couple more weeks and perhaps even going to long-term care.  She has been normotensive and afebrile and also on room air.  Blood sugars actually pretty good most of less than 180 for sleep she is on melatonin 3 mg.  She did not have any other particular questions or other concerns regarding her care.  Did have a chance to address some recent changes including adding vitamin D 50,000 units weekly due to vitamin D level of 23 on January 17.  She does have Steri-Strips over her wounds and also wondering if she could sleep better if she had that cam boot off at night and I think that is a pretty good idea.    Past Medical History:   Diagnosis Date     Branch retinal vein occlusion with macular edema of left eye      Closed fracture of shaft of left tibia      DDD (degenerative disc disease), lumbar      Depression      DM2 (diabetes mellitus, type 2) (H)      LTBI (latent tuberculosis infection)      Morbid obesity (H)      Nonproliferative diabetic retinopathy of both eyes (H)      Ocular hypertension      Pseudophakia of both eyes      Urinary incontinence              Family History   Problem Relation Age of Onset     Heart  disease Mother         MI     Heart disease Father         MI     Glaucoma Brother         2 brothers      Diabetes Brother      Diabetes Brother      Diabetes Brother      Heart disease Brother      Heart disease Brother      Heart disease Brother      Social History     Socioeconomic History     Marital status:      Spouse name: Not on file     Number of children: Not on file     Years of education: Not on file     Highest education level: Not on file   Social Needs     Financial resource strain: Not on file     Food insecurity - worry: Not on file     Food insecurity - inability: Not on file     Transportation needs - medical: Not on file     Transportation needs - non-medical: Not on file   Occupational History     Not on file   Tobacco Use     Smoking status: Never Smoker     Smokeless tobacco: Never Used   Substance and Sexual Activity     Alcohol use: No     Drug use: No     Sexual activity: No   Other Topics Concern     Not on file   Social History Narrative     Not on file         Review of Systems  Currently she denies any chills or fever coughing wheezing chest pain dizziness or vertigo nausea vomiting diarrhea dysuria flulike symptoms headache stiff neck or swollen glands.  History of lumbar degenerative disc disease history of falls chronic pain depression diabetes and recent left tibial fracture    Current Outpatient Medications   Medication Sig     aspirin 81 mg chewable tablet Chew 81 mg daily.     FLUoxetine (PROZAC) 20 MG capsule Take 40 mg by mouth daily.     gabapentin (NEURONTIN) 300 MG capsule Take 300 mg by mouth daily.     insulin aspart protamine-insulin aspart (NOVOLOG/HUMALOG 70-30 FLEXPEN) 100 unit/mL (70-30) pen Inject 15 Units under the skin 2 (two) times a day before meals.     melatonin 3 mg Tab tablet Take 3 mg by mouth at bedtime.     metFORMIN (GLUCOPHAGE-XR) 500 MG 24 hr tablet Take 1,000 mg by mouth daily with supper.     quinapril (ACCUPRIL) 20 MG tablet Take 40 mg by  mouth daily.     simvastatin (ZOCOR) 20 MG tablet Take 20 mg by mouth at bedtime.       .There were no vitals filed for this visit.  Blood pressure 98/60 pulse 68 respirations 18 temperature 98.4  Physical Exam   Constitutional: No distress.   Cardiovascular: Normal rate, regular rhythm and normal heart sounds.   Pulmonary/Chest: Breath sounds normal.   Abdominal: Bowel sounds are normal. There is no tenderness. There is no guarding.   Musculoskeletal:   Recent left tibial fracture secondary to fall status post IM nail.  History of lumbar degenerative disc disease.  History of falls .  Cam boot.  The surgical areas have Steri-Strips clean and dry.  Lymphadenopathy:     She has no cervical adenopathy.   Neurological: She is alert.   Psychiatric: Her behavior is normal.     LABS:   Lab Results   Component Value Date    WBC 6.9 08/16/2010    HGB 13.9 08/16/2010    HCT 40.7 08/16/2010    MCV 90 08/16/2010     08/16/2010     Results for orders placed or performed during the hospital encounter of 08/16/10   Basic Metabolic Panel   Result Value Ref Range    Sodium 138 136 - 145 mmol/L    Potassium 3.6 3.5 - 5.0 mmol/L    CO2 29 25 - 31 mmol/L    Chloride 101 98 - 107 mmol/L    Anion Gap, Calculation 8 5 - 18 mmol/L    BUN 11 8 - 28 mg/dL    Creatinine 0.90 0.60 - 1.10 mg/dL    GFR MDRD Non Af Amer >60 >60 ml/min/1.73m2    GFR MDRD Af Amer >60 >60 ml/min/1.73m2    Glucose 183 (H) 70 - 125 mg/dL    Calcium 9.5 8.5 - 10.5 mg/dL       No results found for: HGBA1C      ASSESSMENT:      ICD-10-CM    1. Closed fracture of proximal end of left fibula with routine healing, unspecified fracture morphology, subsequent encounter S82.832D    2. Essential hypertension I10    3. Type 1 diabetes mellitus without complication (H) E10.9    4. General weakness R53.1        PLAN:    She is on vitamin D2 weekly.  She can keep the cam boot off at night for comfort and rest.  Otherwise I do not see any other problems therapy feels that  she needs to be in therapy another 2-3 more weeks.      Electronically signed by: Michael Duane Johnson, CNP

## 2021-06-23 NOTE — PROGRESS NOTES
[facility]    Facility:   [facility] [[id number]]   Code Status: FULL CODE      CHIEF COMPLAINT/REASON FOR VISIT:  Chief Complaint   Patient presents with     Follow Up     rehab, foot       HISTORY:      HPI: Wendie is a 89 y.o. female who I had the pleasure to revisit with secondary to her hospital admission January 7 - January 11, 2019 secondary to a fall sustaining a spiral fracture of the tibia status post IM nail January 2019.  She currently does have a cam boot good CMS to her left foot.  Blood sugars ranging pretty much all less than 180 she has been normotensive and afebrile.  Taking melatonin for sleep which she states her sleep are working well.  Regular diet.  And also the pain is managed she does take gabapentin 300 mg during the day otherwise no other Tylenol or other analgesic.    Past Medical History:   Diagnosis Date     Branch retinal vein occlusion with macular edema of left eye      Closed fracture of shaft of left tibia      DDD (degenerative disc disease), lumbar      Depression      DM2 (diabetes mellitus, type 2) (H)      LTBI (latent tuberculosis infection)      Morbid obesity (H)      Nonproliferative diabetic retinopathy of both eyes (H)      Ocular hypertension      Pseudophakia of both eyes      Urinary incontinence              Family History   Problem Relation Age of Onset     Heart disease Mother         MI     Heart disease Father         MI     Glaucoma Brother         2 brothers      Diabetes Brother      Diabetes Brother      Diabetes Brother      Heart disease Brother      Heart disease Brother      Heart disease Brother      Social History     Socioeconomic History     Marital status:      Spouse name: Not on file     Number of children: Not on file     Years of education: Not on file     Highest education level: Not on file   Social Needs     Financial resource strain: Not on file     Food insecurity - worry: Not on file      Food insecurity - inability: Not on file     Transportation needs - medical: Not on file     Transportation needs - non-medical: Not on file   Occupational History     Not on file   Tobacco Use     Smoking status: Never Smoker     Smokeless tobacco: Never Used   Substance and Sexual Activity     Alcohol use: No     Drug use: No     Sexual activity: No   Other Topics Concern     Not on file   Social History Narrative     Not on file         Review of Systems  Currently she denies any chills or fever coughing wheezing chest pain dizziness or vertigo nausea vomiting diarrhea dysuria flulike symptoms headache stiff neck or swollen glands.  History of lumbar degenerative disc disease history of falls chronic pain depression diabetes and recent left tibial fracture    Current Outpatient Medications   Medication Sig     aspirin 81 mg chewable tablet Chew 81 mg daily.     FLUoxetine (PROZAC) 20 MG capsule Take 40 mg by mouth daily.     gabapentin (NEURONTIN) 300 MG capsule Take 300 mg by mouth daily.     insulin aspart protamine-insulin aspart (NOVOLOG/HUMALOG 70-30 FLEXPEN) 100 unit/mL (70-30) pen Inject 15 Units under the skin 2 (two) times a day before meals.     melatonin 3 mg Tab tablet Take 3 mg by mouth at bedtime.     metFORMIN (GLUCOPHAGE-XR) 500 MG 24 hr tablet Take 1,000 mg by mouth daily with supper.     quinapril (ACCUPRIL) 20 MG tablet Take 40 mg by mouth daily.     simvastatin (ZOCOR) 20 MG tablet Take 20 mg by mouth at bedtime.       .There were no vitals filed for this visit.  Blood pressure 133/73 pulse 79 respirations 16 temperature 96.8 saturation room air 92%  Physical Exam  Constitutional: No distress.   HENT: Eyes: Pupils are equal, round, and reactive to light.   Neck: Neck supple. No thyromegaly present.   Cardiovascular: Normal rate, regular rhythm and normal heart sounds.   Pulmonary/Chest: Breath sounds normal.   Abdominal: Bowel sounds are normal. There is no tenderness. There is no  guarding.   Musculoskeletal:   Recent left tibial fracture secondary to fall status post IM nail.  History of lumbar degenerative disc disease.  History of falls .  Cam boot  Lymphadenopathy:     She has no cervical adenopathy.   Neurological: She is alert.   Psychiatric: Her behavior is normal.        LABS:   Lab Results   Component Value Date    WBC 6.9 08/16/2010    HGB 13.9 08/16/2010    HCT 40.7 08/16/2010    MCV 90 08/16/2010     08/16/2010     No results found for: HGBA1C      ASSESSMENT:      ICD-10-CM    1. Closed fracture of proximal end of left fibula with routine healing, unspecified fracture morphology, subsequent encounter S82.832D    2. DDD (degenerative disc disease), lumbar M51.36    3. Essential hypertension I10    4. Type 1 diabetes mellitus without complication (H) E10.9        PLAN:    At this time no further changes she does have a visit follow-up with orthopedics on January 22.  Continue to manage and follow.      Electronically signed by: Michael Duane Johnson, CNP

## 2021-06-23 NOTE — PROGRESS NOTES
[facility]    Facility:   [facility] [[id number]]   Code Status: FULL CODE      CHIEF COMPLAINT/REASON FOR VISIT:  Chief Complaint   Patient presents with     Follow Up     rehab, tib ayala       HISTORY:      HPI: Wendie is a 89 y.o. female who I had the pleasure to revisit with secondary to her hospitalization January 7 of January 11, 2019 secondary to a fall sustaining a spiral fracture of the tibia status post IM nail.  Blood sugars have been anywhere from 123-207 most of less than 180 currently on 70/30 insulin 15 units twice daily along with metformin.  For sleep she does take melatonin 3 mg.  She has been normotensive and afebrile.  Therapy does feel like they need another 2 maybe 3 weeks with there is actually looking for a long-term care facility.  Recently did see orthopedics for a recheck on January 12.  She did not have any other particular questions her legs are dry we will get her some Aquaphor lotion twice daily.  Her moods have been stable her pain and neuropathy she is on gabapentin 300 mg daily not requiring any Tylenol.    Past Medical History:   Diagnosis Date     Branch retinal vein occlusion with macular edema of left eye      Closed fracture of shaft of left tibia      DDD (degenerative disc disease), lumbar      Depression      DM2 (diabetes mellitus, type 2) (H)      LTBI (latent tuberculosis infection)      Morbid obesity (H)      Nonproliferative diabetic retinopathy of both eyes (H)      Ocular hypertension      Pseudophakia of both eyes      Urinary incontinence              Family History   Problem Relation Age of Onset     Heart disease Mother         MI     Heart disease Father         MI     Glaucoma Brother         2 brothers      Diabetes Brother      Diabetes Brother      Diabetes Brother      Heart disease Brother      Heart disease Brother      Heart disease Brother      Social History     Socioeconomic History     Marital status:       Spouse name: Not on file     Number of children: Not on file     Years of education: Not on file     Highest education level: Not on file   Social Needs     Financial resource strain: Not on file     Food insecurity - worry: Not on file     Food insecurity - inability: Not on file     Transportation needs - medical: Not on file     Transportation needs - non-medical: Not on file   Occupational History     Not on file   Tobacco Use     Smoking status: Never Smoker     Smokeless tobacco: Never Used   Substance and Sexual Activity     Alcohol use: No     Drug use: No     Sexual activity: No   Other Topics Concern     Not on file   Social History Narrative     Not on file         Review of Systems  Currently she denies any chills or fever coughing wheezing chest pain dizziness or vertigo nausea vomiting diarrhea dysuria flulike symptoms headache stiff neck or swollen glands.  History of lumbar degenerative disc disease history of falls chronic pain depression diabetes and recent left tibial fracture    Current Outpatient Medications   Medication Sig     aspirin 81 mg chewable tablet Chew 81 mg daily.     FLUoxetine (PROZAC) 20 MG capsule Take 40 mg by mouth daily.     gabapentin (NEURONTIN) 300 MG capsule Take 300 mg by mouth daily.     insulin aspart protamine-insulin aspart (NOVOLOG/HUMALOG 70-30 FLEXPEN) 100 unit/mL (70-30) pen Inject 15 Units under the skin 2 (two) times a day before meals.     melatonin 3 mg Tab tablet Take 3 mg by mouth at bedtime.     metFORMIN (GLUCOPHAGE-XR) 500 MG 24 hr tablet Take 1,000 mg by mouth daily with supper.     quinapril (ACCUPRIL) 20 MG tablet Take 40 mg by mouth daily.     simvastatin (ZOCOR) 20 MG tablet Take 20 mg by mouth at bedtime.       .There were no vitals filed for this visit.  Blood pressure 149/72 pulse 81 respirations 18 temperature 97.5 saturation room air 92%  Physical Exam    Constitutional: No distress.   Cardiovascular: Normal rate, regular rhythm and  normal heart sounds.   Pulmonary/Chest: Breath sounds normal.   Abdominal: Bowel sounds are normal. There is no tenderness. There is no guarding.   Musculoskeletal:   Recent left tibial fracture secondary to fall status post IM nail.  History of lumbar degenerative disc disease.  History of falls .  Cam boot.  The surgical areas have Steri-Strips clean and dry.Neurological: She is alert.   Psychiatric: Her behavior is normal.      LABS:   No results found for: HGBA1C  Results for orders placed or performed during the hospital encounter of 08/16/10   Basic Metabolic Panel   Result Value Ref Range    Sodium 138 136 - 145 mmol/L    Potassium 3.6 3.5 - 5.0 mmol/L    CO2 29 25 - 31 mmol/L    Chloride 101 98 - 107 mmol/L    Anion Gap, Calculation 8 5 - 18 mmol/L    BUN 11 8 - 28 mg/dL    Creatinine 0.90 0.60 - 1.10 mg/dL    GFR MDRD Non Af Amer >60 >60 ml/min/1.73m2    GFR MDRD Af Amer >60 >60 ml/min/1.73m2    Glucose 183 (H) 70 - 125 mg/dL    Calcium 9.5 8.5 - 10.5 mg/dL       Lab Results   Component Value Date    WBC 6.9 08/16/2010    HGB 13.9 08/16/2010    HCT 40.7 08/16/2010    MCV 90 08/16/2010     08/16/2010         ASSESSMENT:      ICD-10-CM    1. Closed fracture of proximal end of left fibula with routine healing, unspecified fracture morphology, subsequent encounter S82.832D    2. Essential hypertension I10    3. Type 1 diabetes mellitus without complication (H) E10.9    4. Physical debility R53.81        PLAN:    No further changes at this time we will get her some Aquaphor lotion most her blood sugars less than 180.  Therapy does feel the need another 1-2 more weeks with her and perhaps at that point going to long-term care.      Electronically signed by: Michael Duane Johnson, CNP

## 2021-06-23 NOTE — PROGRESS NOTES
[facility]    Facility:   [facility] [[id number]]   Code Status: FULL CODE      CHIEF COMPLAINT/REASON FOR VISIT:  Chief Complaint   Patient presents with     Follow Up     rehab, htn, tib fx       HISTORY:      HPI: Wendie is a 89 y.o. female who I had the pleasure to revisit with secondary to her hospitalization January 7 - January 11, 2019 secondary to a fall sustaining a spiral fracture of the tibia status post IM nail.  From a therapy standpoint she still is a moderate to max assist slide board transfers they are actually looking for long-term care probably another week or 2 on the facility.  Appetite is good.  Her blood pressures have been running anywhere from 140-170 systolically and currently is on lisinopril 40 mg daily will now add clonidine 0.1 mg daily.  Not having any pain issues.  Blood sugars for the most part most of them less than 190 there have been a couple in the mid 200 range but no recent changes of her insulin we will keep a close eye on that.  Does take melatonin for sleep.  Did not really have any other particular questions regarding her stay on the transitional care unit she is also looking for a care conference which is not yet been determined.    Past Medical History:   Diagnosis Date     Branch retinal vein occlusion with macular edema of left eye      Closed fracture of shaft of left tibia      DDD (degenerative disc disease), lumbar      Depression      DM2 (diabetes mellitus, type 2) (H)      LTBI (latent tuberculosis infection)      Morbid obesity (H)      Nonproliferative diabetic retinopathy of both eyes (H)      Ocular hypertension      Pseudophakia of both eyes      Urinary incontinence              Family History   Problem Relation Age of Onset     Heart disease Mother         MI     Heart disease Father         MI     Glaucoma Brother         2 brothers      Diabetes Brother      Diabetes Brother      Diabetes Brother      Heart  disease Brother      Heart disease Brother      Heart disease Brother      Social History     Socioeconomic History     Marital status:      Spouse name: Not on file     Number of children: Not on file     Years of education: Not on file     Highest education level: Not on file   Social Needs     Financial resource strain: Not on file     Food insecurity - worry: Not on file     Food insecurity - inability: Not on file     Transportation needs - medical: Not on file     Transportation needs - non-medical: Not on file   Occupational History     Not on file   Tobacco Use     Smoking status: Never Smoker     Smokeless tobacco: Never Used   Substance and Sexual Activity     Alcohol use: No     Drug use: No     Sexual activity: No   Other Topics Concern     Not on file   Social History Narrative     Not on file         Review of Systems  Currently she denies any chills or fever coughing wheezing chest pain dizziness or vertigo nausea vomiting diarrhea dysuria flulike symptoms headache stiff neck or swollen glands.  History of lumbar degenerative disc disease history of falls chronic pain depression diabetes and recent left tibial fracture    Current Outpatient Medications   Medication Sig     cloNIDine HCl (CATAPRES) 0.1 MG tablet Take 0.1 mg by mouth daily.     aspirin 81 mg chewable tablet Chew 81 mg daily.     FLUoxetine (PROZAC) 20 MG capsule Take 40 mg by mouth daily.     gabapentin (NEURONTIN) 300 MG capsule Take 300 mg by mouth daily.     insulin aspart protamine-insulin aspart (NOVOLOG/HUMALOG 70-30 FLEXPEN) 100 unit/mL (70-30) pen Inject 15 Units under the skin 2 (two) times a day before meals.     melatonin 3 mg Tab tablet Take 3 mg by mouth at bedtime.     metFORMIN (GLUCOPHAGE-XR) 500 MG 24 hr tablet Take 1,000 mg by mouth daily with supper.     quinapril (ACCUPRIL) 20 MG tablet Take 40 mg by mouth daily.     simvastatin (ZOCOR) 20 MG tablet Take 20 mg by mouth at bedtime.       .There were no vitals  filed for this visit.  Blood pressure 158/73 pulse 74 respirations 18 temperature 98.2 saturation room air 93%  Physical Exam  Constitutional: No distress.   Cardiovascular: Normal rate, regular rhythm and normal heart sounds.   Pulmonary/Chest: Breath sounds normal.   Abdominal: Bowel sounds are normal.   Musculoskeletal:   Recent left tibial fracture secondary to fall status post IM nail.  History of lumbar degenerative disc disease.  History of falls .  Cam boot.  The surgical scars are healing.Neurological: She is alert.   Psychiatric: Her behavior is normal.       LABS:   Lab Results   Component Value Date    WBC 6.9 08/16/2010    HGB 13.9 08/16/2010    HCT 40.7 08/16/2010    MCV 90 08/16/2010     08/16/2010     Results for orders placed or performed during the hospital encounter of 08/16/10   Basic Metabolic Panel   Result Value Ref Range    Sodium 138 136 - 145 mmol/L    Potassium 3.6 3.5 - 5.0 mmol/L    CO2 29 25 - 31 mmol/L    Chloride 101 98 - 107 mmol/L    Anion Gap, Calculation 8 5 - 18 mmol/L    BUN 11 8 - 28 mg/dL    Creatinine 0.90 0.60 - 1.10 mg/dL    GFR MDRD Non Af Amer >60 >60 ml/min/1.73m2    GFR MDRD Af Amer >60 >60 ml/min/1.73m2    Glucose 183 (H) 70 - 125 mg/dL    Calcium 9.5 8.5 - 10.5 mg/dL       No results found for: HGBA1C      ASSESSMENT:      ICD-10-CM    1. Essential hypertension I10    2. Type 1 diabetes mellitus without complication (H) E10.9    3. Physical debility R53.81    4. Closed fracture of proximal end of left fibula with routine healing, unspecified fracture morphology, subsequent encounter S82.832D        PLAN:    Adding clonidine 0.1 mg daily for the blood pressures keep a close eye on the blood pressures as well as the blood sugars.  She will hopefully have a care conference soon and also therapy staff are suggesting long-term care.  Still moderate to max assist.      Electronically signed by: Michael Duane Johnson, CNP

## 2021-06-23 NOTE — PROGRESS NOTES
[facility]    Facility:   [facility] [[id number]]   Code Status: FULL CODE      CHIEF COMPLAINT/REASON FOR VISIT:  Chief Complaint   Patient presents with     Follow Up     rehab, tib       HISTORY:      HPI: Wendie is a 89 y.o. female who I had the chance and pleasure to revisit with secondary to her hospitalization January 7 - January 11, 2019 secondary to a fall sustaining a spiral fracture of the tibia status post IM nail.  She does have hypertension recently added clonidine 0.1 mg daily since most of her blood pressures have been running 140-170 systolically and also currently is on quinapril 40 mg daily and blood pressures have slightly improved we will keep a close eye on that she otherwise has been asymptomatic.  Blood sugars less than 190.  Is performing in therapy in talking to the therapy group she is weightbearing as tolerated they are looking at perhaps long-term care may be another 1 or 2 more weeks.  Appetite is good.  Sleeping well at night.  Does take melatonin.  He did not have any other particular complaints or other problems.  She did see orthopedics last on January 22    Past Medical History:   Diagnosis Date     Branch retinal vein occlusion with macular edema of left eye      Closed fracture of shaft of left tibia      DDD (degenerative disc disease), lumbar      Depression      DM2 (diabetes mellitus, type 2) (H)      LTBI (latent tuberculosis infection)      Morbid obesity (H)      Nonproliferative diabetic retinopathy of both eyes (H)      Ocular hypertension      Pseudophakia of both eyes      Urinary incontinence              Family History   Problem Relation Age of Onset     Heart disease Mother         MI     Heart disease Father         MI     Glaucoma Brother         2 brothers      Diabetes Brother      Diabetes Brother      Diabetes Brother      Heart disease Brother      Heart disease Brother      Heart disease Brother      Social History      Socioeconomic History     Marital status:      Spouse name: Not on file     Number of children: Not on file     Years of education: Not on file     Highest education level: Not on file   Social Needs     Financial resource strain: Not on file     Food insecurity - worry: Not on file     Food insecurity - inability: Not on file     Transportation needs - medical: Not on file     Transportation needs - non-medical: Not on file   Occupational History     Not on file   Tobacco Use     Smoking status: Never Smoker     Smokeless tobacco: Never Used   Substance and Sexual Activity     Alcohol use: No     Drug use: No     Sexual activity: No   Other Topics Concern     Not on file   Social History Narrative     Not on file         Review of Systems  Currently she denies any chills or fever coughing wheezing chest pain dizziness or vertigo nausea vomiting diarrhea dysuria flulike symptoms headache stiff neck or swollen glands.  History of lumbar degenerative disc disease history of falls chronic pain depression diabetes and recent left tibial fracture     Current Outpatient Medications   Medication Sig     aspirin 81 mg chewable tablet Chew 81 mg daily.     cloNIDine HCl (CATAPRES) 0.1 MG tablet Take 0.1 mg by mouth daily.     FLUoxetine (PROZAC) 20 MG capsule Take 40 mg by mouth daily.     gabapentin (NEURONTIN) 300 MG capsule Take 300 mg by mouth daily.     insulin aspart protamine-insulin aspart (NOVOLOG/HUMALOG 70-30 FLEXPEN) 100 unit/mL (70-30) pen Inject 15 Units under the skin 2 (two) times a day before meals.     melatonin 3 mg Tab tablet Take 3 mg by mouth at bedtime.     metFORMIN (GLUCOPHAGE-XR) 500 MG 24 hr tablet Take 1,000 mg by mouth daily with supper.     quinapril (ACCUPRIL) 20 MG tablet Take 40 mg by mouth daily.     simvastatin (ZOCOR) 20 MG tablet Take 20 mg by mouth at bedtime.       .There were no vitals filed for this visit.  Blood pressure 139/66 pulse 71 respirations 18 temperature  97.2  Physical Exam  Constitutional: No distress.   Cardiovascular: Normal rate, regular rhythm and normal heart sounds.   Pulmonary/Chest: Breath sounds normal.   Abdominal: Bowel sounds are normal.   Musculoskeletal:   Recent left tibial fracture secondary to fall status post IM nail.  History of lumbar degenerative disc disease.  History of falls .  Cam boot.  The surgical scars are healing.Neurological: She is alert.   Psychiatric: Her behavior is normal.       LABS:   Lab Results   Component Value Date    WBC 6.9 08/16/2010    HGB 13.9 08/16/2010    HCT 40.7 08/16/2010    MCV 90 08/16/2010     08/16/2010     Results for orders placed or performed during the hospital encounter of 08/16/10   Basic Metabolic Panel   Result Value Ref Range    Sodium 138 136 - 145 mmol/L    Potassium 3.6 3.5 - 5.0 mmol/L    CO2 29 25 - 31 mmol/L    Chloride 101 98 - 107 mmol/L    Anion Gap, Calculation 8 5 - 18 mmol/L    BUN 11 8 - 28 mg/dL    Creatinine 0.90 0.60 - 1.10 mg/dL    GFR MDRD Non Af Amer >60 >60 ml/min/1.73m2    GFR MDRD Af Amer >60 >60 ml/min/1.73m2    Glucose 183 (H) 70 - 125 mg/dL    Calcium 9.5 8.5 - 10.5 mg/dL           ASSESSMENT:      ICD-10-CM    1. Essential hypertension I10    2. Type 1 diabetes mellitus without complication (H) E10.9    3. DDD (degenerative disc disease), lumbar M51.36    4. Closed fracture of proximal end of left fibula with routine healing, unspecified fracture morphology, subsequent encounter S82.832D        PLAN:    Continue with monitoring the blood pressures.  Recent addition of clonidine 0.1 mg.  Continue to work with therapy group.  Looks like another week or 2 and perhaps going into long-term care.  Last visit with orthopedics was January 22.      Electronically signed by: Michael Duane Johnson, CNP

## 2021-06-23 NOTE — PROGRESS NOTES
[facility]    Facility:   [facility] [[id number]]   Code Status: Full    CHIEF COMPLAINT/REASON FOR VISIT:  Chief Complaint   Patient presents with     Follow Up     rehab, brett cai       HISTORY:      HPI: Wendie is a 89 y.o. female who I had the chance and pleasure to revisit with secondary to her hospitalization January 7 - January 11, 2019 secondary to a fall sustaining a spiral fracture of the tibia status post IM nail.  She currently has been doing well still complains of generalized weakness and leg weakness.  Is doing some therapy.  Her pain seems to be well managed at this time she is not requiring any Tylenol or narcotic analgesics she does take gabapentin 300 mg once daily for neuropathy.  She has been normotensive and afebrile.  Her blood sugars less than 180.  Appetite good.  Sleeping well at night.  In talking with the therapy department she is weightbearing as tolerated they are looking for long-term care looks like she will be here another week or 2.    Past Medical History:   Diagnosis Date     Branch retinal vein occlusion with macular edema of left eye      Closed fracture of shaft of left tibia      DDD (degenerative disc disease), lumbar      Depression      DM2 (diabetes mellitus, type 2) (H)      LTBI (latent tuberculosis infection)      Morbid obesity (H)      Nonproliferative diabetic retinopathy of both eyes (H)      Ocular hypertension      Pseudophakia of both eyes      Urinary incontinence              Family History   Problem Relation Age of Onset     Heart disease Mother         MI     Heart disease Father         MI     Glaucoma Brother         2 brothers      Diabetes Brother      Diabetes Brother      Diabetes Brother      Heart disease Brother      Heart disease Brother      Heart disease Brother      Social History     Socioeconomic History     Marital status:      Spouse name: Not on file     Number of children: Not on file      Years of education: Not on file     Highest education level: Not on file   Social Needs     Financial resource strain: Not on file     Food insecurity - worry: Not on file     Food insecurity - inability: Not on file     Transportation needs - medical: Not on file     Transportation needs - non-medical: Not on file   Occupational History     Not on file   Tobacco Use     Smoking status: Never Smoker     Smokeless tobacco: Never Used   Substance and Sexual Activity     Alcohol use: No     Drug use: No     Sexual activity: No   Other Topics Concern     Not on file   Social History Narrative     Not on file         Review of Systems  Currently she denies any chills or fever coughing wheezing chest pain dizziness or vertigo nausea vomiting diarrhea dysuria flulike symptoms headache stiff neck or swollen glands.  History of lumbar degenerative disc disease history of falls chronic pain depression diabetes and recent left tibial fracture    Current Outpatient Medications   Medication Sig     aspirin 81 mg chewable tablet Chew 81 mg daily.     FLUoxetine (PROZAC) 20 MG capsule Take 40 mg by mouth daily.     gabapentin (NEURONTIN) 300 MG capsule Take 300 mg by mouth daily.     insulin aspart protamine-insulin aspart (NOVOLOG/HUMALOG 70-30 FLEXPEN) 100 unit/mL (70-30) pen Inject 15 Units under the skin 2 (two) times a day before meals.     melatonin 3 mg Tab tablet Take 3 mg by mouth at bedtime.     metFORMIN (GLUCOPHAGE-XR) 500 MG 24 hr tablet Take 1,000 mg by mouth daily with supper.     quinapril (ACCUPRIL) 20 MG tablet Take 40 mg by mouth daily.     simvastatin (ZOCOR) 20 MG tablet Take 20 mg by mouth at bedtime.   \    .There were no vitals filed for this visit.  Blood pressure 148/76 pulse 76 respirations 18 temperature 96.9  Physical Exam     Constitutional: No distress.   Cardiovascular: Normal rate, regular rhythm and normal heart sounds.   Pulmonary/Chest: Breath sounds normal.   Abdominal: Bowel sounds are  normal.   Musculoskeletal:   Recent left tibial fracture secondary to fall status post IM nail.  History of lumbar degenerative disc disease.  History of falls .  Cam boot.  The surgical scars are healing.  Lymphadenopathy:     She has no cervical adenopathy.   Neurological: She is alert.   Psychiatric: Her behavior is normal.     LABS:   Lab Results   Component Value Date    WBC 6.9 08/16/2010    HGB 13.9 08/16/2010    HCT 40.7 08/16/2010    MCV 90 08/16/2010     08/16/2010         ASSESSMENT:      ICD-10-CM    1. Closed fracture of proximal end of left fibula with routine healing, unspecified fracture morphology, subsequent encounter S82.832D    2. Type 1 diabetes mellitus without complication (H) E10.9    3. Leg weakness, bilateral R29.898    4. Pain management R52        PLAN:    No further changes at this time.  She is getting Aquaphor twice a day to her dry skin otherwise did encourage the rehabilitation process.  Looks like therapy wants her here another 2 weeks or so for the rehabilitation process.    .    Electronically signed by: Michael Duane Johnson, CNP

## 2021-06-24 NOTE — PROGRESS NOTES
[facility]    Facility:   [facility] [[id number]]   Code Status: FULL CODE  PCP: Nichole Kahn PA-C   Phone: [phone]   Fax: [phone]      CHIEF COMPLAINT/REASON FOR VISIT:  Chief Complaint   Patient presents with     Discharge Summary       HISTORY COURSE:  Wendie is a 89 y.o. female who I was asked to see secondary to most recent admission to the transitional care unit but also most recent hospitalization January 7 - January 11, 2019 secondary to a fall while trying to get off the toilet earlier in the day.  She has a history of right leg pain and weakness chronically due to lumbar degenerative disc disease.  She hit her left leg on the toilet.  The x-ray did show a spiral fracture of the tibia and she went for surgery on January 8, 2019.  She also does have a history of diabetes we did a chance to address that.  She is currently on insulin 15 units twice daily 7030 mix along with metformin thousand milligrams daily and blood sugars have been pretty much less than 170 there was one at 250 4P most recently was having issues with sleep melatonin was initially instituted she did sleep pretty good last night.  Regarding her osteoporosis she does have a vitamin D level ordered we will wait for those results.   She has been on the transitional care unit and at this point fairly limited rehabilitation has been able to do slide board transfers but the progress has stalled.  They did recommend long-term care for her.  She will be discharging to a long-term care facility.  Her blood pressures have been occasionally labile but more so running in the 140-160 systolic range of been a number of adjustments to her medication including continuing with the lisinopril but also adding clonidine 0.1 mg daily and then recently added HCTZ 25 mg.  That did seem to normalize her blood pressures.  For sleep was ordered melatonin and then eventually added was trazodone 50 mg and  her sleep also did improve.  Not having any pain.  Her blood sugars overall have been doing fairly well using her 7030 mix 15 units twice daily.  The blood sugars in the morning range 121-170 in the p.m. ranging 140-207.  Her appetite is good.  Her moods have been stable she does have a history of depression currently on fluoxetine 40 mg.  Also added was vitamin D2 50,000 units weekly secondary to a vitamin D level of 23 on January 17 and in about 12 weeks from that date she should have another vitamin D checked.  Review of Systems  Currently she denies any chills or fever coughing wheezing chest pain dizziness or vertigo nausea vomiting diarrhea dysuria flulike symptoms headache stiff neck or swollen glands.  History of lumbar degenerative disc disease history of falls chronic pain depression diabetes and recent left tibial fracture     Vitals:    02/15/19 1016   BP: 139/65   Pulse: 69   Resp: 18   Temp: 97.1  F (36.2  C)   SpO2: 96%       Physical Exam  Constitutional: No distress.   Cardiovascular: Normal rate, regular rhythm and normal heart sounds.   Pulmonary/Chest: Breath sounds normal.   Abdominal: Bowel sounds are normal.   Musculoskeletal:   Recent left tibial fracture secondary to fall status post IM nail.  History of lumbar degenerative disc disease.  History of falls .  Cam boot.   Neurological: She is alert.   Psychiatric: Her behavior is normal.       Lab Results   Component Value Date    WBC 6.9 08/16/2010    HGB 13.9 08/16/2010    HCT 40.7 08/16/2010    MCV 90 08/16/2010     08/16/2010     Vitamin D, Total (25-Hydroxy)   Date Value Ref Range Status   01/17/2019 23.1 (L) 30.0 - 80.0 ng/mL Final     Results for orders placed or performed during the hospital encounter of 08/16/10   Basic Metabolic Panel   Result Value Ref Range    Sodium 138 136 - 145 mmol/L    Potassium 3.6 3.5 - 5.0 mmol/L    CO2 29 25 - 31 mmol/L    Chloride 101 98 - 107 mmol/L    Anion Gap, Calculation 8 5 - 18 mmol/L    BUN  11 8 - 28 mg/dL    Creatinine 0.90 0.60 - 1.10 mg/dL    GFR MDRD Non Af Amer >60 >60 ml/min/1.73m2    GFR MDRD Af Amer >60 >60 ml/min/1.73m2    Glucose 183 (H) 70 - 125 mg/dL    Calcium 9.5 8.5 - 10.5 mg/dL         MEDICATION LIST:  Current Outpatient Medications   Medication Sig     hydroCHLOROthiazide (HYDRODIURIL) 25 MG tablet Take 25 mg by mouth daily.     aspirin 81 mg chewable tablet Chew 81 mg daily.     cloNIDine HCl (CATAPRES) 0.1 MG tablet Take 0.1 mg by mouth daily.     FLUoxetine (PROZAC) 20 MG capsule Take 40 mg by mouth daily.     gabapentin (NEURONTIN) 300 MG capsule Take 300 mg by mouth daily.     insulin aspart protamine-insulin aspart (NOVOLOG/HUMALOG 70-30 FLEXPEN) 100 unit/mL (70-30) pen Inject 15 Units under the skin 2 (two) times a day before meals.     melatonin 3 mg Tab tablet Take 3 mg by mouth at bedtime.     metFORMIN (GLUCOPHAGE-XR) 500 MG 24 hr tablet Take 1,000 mg by mouth daily with supper.     quinapril (ACCUPRIL) 20 MG tablet Take 40 mg by mouth daily.     simvastatin (ZOCOR) 20 MG tablet Take 20 mg by mouth at bedtime.       DISCHARGE DIAGNOSIS:    ICD-10-CM    1. Essential hypertension I10    2. Type 1 diabetes mellitus without complication (H) E10.9    3. Closed fracture of proximal end of left fibula with routine healing, unspecified fracture morphology, subsequent encounter S82.832D    4. Sleep disorder G47.9        MEDICAL EQUIPMENT NEEDS:  None    DISCHARGE PLAN/FACE TO FACE:  I certify that services are/were furnished while this patient was under the care of a physician and that a physician or an allowed non-physician practitioner (NPP), had a face-to-face encounter that meets the physician face-to-face encounter requirements. The encounter was in whole, or in part, related to the primary reason for home health. The patient is confined to his/her home and needs intermittent skilled nursing, physical therapy, speech-language pathology, or the continued need for occupational  therapy. A plan of care has been established by a physician and is periodically reviewed by a physician.  Date of Face-to-Face Encounter: February 15, 2019    I certify that, based on my findings, the following services are medically necessary home health services: She will be discharging to [facility] on February 15, 2019 with current medications and also will receive physical and occupational therapy    My clinical findings support the need for the above skilled services because: (Please write a brief narrative summary that describes what the RN, PT, SLP, or other services will be doing in the home. A list of diagnoses in this section does not meet the CMS requirements.)  She will require the skilled services secondary to her tibial fracture along with generalized weakness self-care deficits plateauing therapy.    This patient is homebound because: (Please write a brief narrative summary describing the functional limitations as to why this patient is homebound and specifically what makes this patient homebound.)  Secondary to multiple chronic medical conditions including her tibial fracture self-care deficits safety medication management and she will be going to long-term care.    The patient is, or has been, under my care and I have initiated the establishment of the plan of care. This patient will be followed by a physician who will periodically review the plan of care.    Schedule follow up visit with primary care provider within 7 days to reestablish care.  She will follow-up with the primary care provider at the facility to go over her medications and certainly any future laboratory studies including the next vitamin D level 12 weeks after January 17 secondary to a level of 23 and currently on vitamin D2 50,000 units weekly.  Also manage and monitor and follow the blood pressures closely and make adjustments as needed.  She did not have any other further questions or concerns regarding her stay as well  as interventions while on the transitional care unit.    Discharge coordination of care greater than 30 minutes.  Electronically signed by: Michael Duane Johnson, CNP